# Patient Record
Sex: FEMALE | Race: BLACK OR AFRICAN AMERICAN | Employment: OTHER | URBAN - METROPOLITAN AREA
[De-identification: names, ages, dates, MRNs, and addresses within clinical notes are randomized per-mention and may not be internally consistent; named-entity substitution may affect disease eponyms.]

---

## 2023-02-22 LAB
ALANINE AMINOTRANSFERASE (SGPT) (U/L) IN SER/PLAS: 14 U/L (ref 7–45)
ALBUMIN (G/DL) IN SER/PLAS: 4.2 G/DL (ref 3.4–5)
ALKALINE PHOSPHATASE (U/L) IN SER/PLAS: 81 U/L (ref 33–136)
ANION GAP IN SER/PLAS: 14 MMOL/L (ref 10–20)
ASPARTATE AMINOTRANSFERASE (SGOT) (U/L) IN SER/PLAS: 10 U/L (ref 9–39)
BILIRUBIN TOTAL (MG/DL) IN SER/PLAS: 0.2 MG/DL (ref 0–1.2)
CALCIUM (MG/DL) IN SER/PLAS: 8.9 MG/DL (ref 8.6–10.6)
CARBON DIOXIDE, TOTAL (MMOL/L) IN SER/PLAS: 25 MMOL/L (ref 21–32)
CHLORIDE (MMOL/L) IN SER/PLAS: 106 MMOL/L (ref 98–107)
CREATININE (MG/DL) IN SER/PLAS: 0.82 MG/DL (ref 0.5–1.05)
GFR FEMALE: 71 ML/MIN/1.73M2
GLUCOSE (MG/DL) IN SER/PLAS: 143 MG/DL (ref 74–99)
POTASSIUM (MMOL/L) IN SER/PLAS: 4.4 MMOL/L (ref 3.5–5.3)
PROTEIN TOTAL: 7.4 G/DL (ref 6.4–8.2)
SODIUM (MMOL/L) IN SER/PLAS: 141 MMOL/L (ref 136–145)
UREA NITROGEN (MG/DL) IN SER/PLAS: 12 MG/DL (ref 6–23)

## 2023-04-25 DIAGNOSIS — J45.909 ASTHMA, UNSPECIFIED ASTHMA SEVERITY, UNSPECIFIED WHETHER COMPLICATED, UNSPECIFIED WHETHER PERSISTENT (HHS-HCC): Primary | ICD-10-CM

## 2023-04-25 RX ORDER — MONTELUKAST SODIUM 10 MG/1
10 TABLET ORAL DAILY
Qty: 90 TABLET | Refills: 1 | Status: SHIPPED | OUTPATIENT
Start: 2023-04-25 | End: 2023-10-23

## 2023-04-25 RX ORDER — MONTELUKAST SODIUM 10 MG/1
1 TABLET ORAL DAILY
COMMUNITY
Start: 2019-05-16 | End: 2023-04-25 | Stop reason: SDUPTHER

## 2023-05-13 DIAGNOSIS — I10 HYPERTENSION, UNSPECIFIED TYPE: Primary | ICD-10-CM

## 2023-05-15 RX ORDER — AMLODIPINE BESYLATE 10 MG/1
TABLET ORAL
Qty: 100 TABLET | Refills: 2 | Status: SHIPPED | OUTPATIENT
Start: 2023-05-15 | End: 2024-01-15

## 2023-05-15 RX ORDER — AMLODIPINE BESYLATE 10 MG/1
1 TABLET ORAL DAILY
COMMUNITY
Start: 2019-05-16 | End: 2023-09-08 | Stop reason: SDUPTHER

## 2023-05-18 DIAGNOSIS — J45.909 MILD ASTHMA, UNSPECIFIED WHETHER COMPLICATED, UNSPECIFIED WHETHER PERSISTENT (HHS-HCC): Primary | ICD-10-CM

## 2023-05-19 RX ORDER — ALBUTEROL SULFATE 90 UG/1
AEROSOL, METERED RESPIRATORY (INHALATION)
Qty: 51 G | Refills: 2 | Status: SHIPPED | OUTPATIENT
Start: 2023-05-19

## 2023-05-22 ENCOUNTER — DOCUMENTATION (OUTPATIENT)
Dept: PRIMARY CARE | Facility: CLINIC | Age: 83
End: 2023-05-22
Payer: MEDICARE

## 2023-05-23 ENCOUNTER — PATIENT OUTREACH (OUTPATIENT)
Dept: PRIMARY CARE | Facility: CLINIC | Age: 83
End: 2023-05-23
Payer: MEDICARE

## 2023-05-23 DIAGNOSIS — K92.2 ACUTE GI BLEEDING: ICD-10-CM

## 2023-05-23 RX ORDER — VANCOMYCIN HYDROCHLORIDE 250 MG/1
250 CAPSULE ORAL 4 TIMES DAILY
COMMUNITY
End: 2023-11-30 | Stop reason: ALTCHOICE

## 2023-05-23 RX ORDER — PANTOPRAZOLE SODIUM 40 MG/1
40 TABLET, DELAYED RELEASE ORAL
COMMUNITY
End: 2023-07-05 | Stop reason: SDUPTHER

## 2023-05-23 RX ORDER — METRONIDAZOLE 500 MG/1
500 TABLET ORAL 3 TIMES DAILY
COMMUNITY
End: 2023-11-30 | Stop reason: ALTCHOICE

## 2023-05-23 RX ORDER — CIPROFLOXACIN 500 MG/1
500 TABLET ORAL
COMMUNITY
End: 2023-09-08 | Stop reason: ALTCHOICE

## 2023-05-23 NOTE — PROGRESS NOTES
Discharge Facility:[facility]  Discharge Diagnosis:Acute GI bleeding   Admission Date:5/12/23  Discharge Date: 5/19/23    PCP Appointment Date:No available appts  Specialist Appointment Date:   Hospital Encounter and Summary: not available at this time  See discharge assessment below for further details

## 2023-06-23 NOTE — PROGRESS NOTES
Patient never followed up with PCP  Unable to reach patient.  This patient never followed up with PCP.  LVM  with call back number for patient to call if needed.

## 2023-07-05 DIAGNOSIS — R13.10 DYSPHAGIA, UNSPECIFIED TYPE: Primary | ICD-10-CM

## 2023-07-05 RX ORDER — PANTOPRAZOLE SODIUM 40 MG/1
40 TABLET, DELAYED RELEASE ORAL
Qty: 90 TABLET | Refills: 2 | Status: SHIPPED | OUTPATIENT
Start: 2023-07-05 | End: 2024-04-23

## 2023-07-27 ENCOUNTER — OFFICE VISIT (OUTPATIENT)
Dept: PRIMARY CARE | Facility: CLINIC | Age: 83
End: 2023-07-27
Payer: MEDICARE

## 2023-07-27 VITALS
DIASTOLIC BLOOD PRESSURE: 66 MMHG | HEIGHT: 63 IN | BODY MASS INDEX: 30.65 KG/M2 | TEMPERATURE: 97.2 F | HEART RATE: 80 BPM | SYSTOLIC BLOOD PRESSURE: 113 MMHG | WEIGHT: 173 LBS

## 2023-07-27 DIAGNOSIS — M54.2 NECK PAIN: Primary | ICD-10-CM

## 2023-07-27 DIAGNOSIS — E78.9 LIPID DISORDER: ICD-10-CM

## 2023-07-27 DIAGNOSIS — E11.9 TYPE 2 DIABETES MELLITUS WITHOUT COMPLICATION, WITHOUT LONG-TERM CURRENT USE OF INSULIN (MULTI): ICD-10-CM

## 2023-07-27 DIAGNOSIS — K27.9 PUD (PEPTIC ULCER DISEASE): ICD-10-CM

## 2023-07-27 LAB
ALANINE AMINOTRANSFERASE (SGPT) (U/L) IN SER/PLAS: 10 U/L (ref 7–45)
ALBUMIN (G/DL) IN SER/PLAS: 4.1 G/DL (ref 3.4–5)
ALKALINE PHOSPHATASE (U/L) IN SER/PLAS: 86 U/L (ref 33–136)
ASPARTATE AMINOTRANSFERASE (SGOT) (U/L) IN SER/PLAS: 10 U/L (ref 9–39)
BILIRUBIN DIRECT (MG/DL) IN SER/PLAS: 0 MG/DL (ref 0–0.3)
BILIRUBIN TOTAL (MG/DL) IN SER/PLAS: 0.2 MG/DL (ref 0–1.2)
CHOLESTEROL (MG/DL) IN SER/PLAS: 144 MG/DL (ref 0–199)
CHOLESTEROL IN HDL (MG/DL) IN SER/PLAS: 40.2 MG/DL
CHOLESTEROL/HDL RATIO: 3.6
LDL: 69 MG/DL (ref 0–99)
PROTEIN TOTAL: 7.4 G/DL (ref 6.4–8.2)
TRIGLYCERIDE (MG/DL) IN SER/PLAS: 175 MG/DL (ref 0–149)
VLDL: 35 MG/DL (ref 0–40)

## 2023-07-27 PROCEDURE — 80076 HEPATIC FUNCTION PANEL: CPT

## 2023-07-27 PROCEDURE — 99214 OFFICE O/P EST MOD 30 MIN: CPT | Performed by: FAMILY MEDICINE

## 2023-07-27 PROCEDURE — 1036F TOBACCO NON-USER: CPT | Performed by: FAMILY MEDICINE

## 2023-07-27 PROCEDURE — 1159F MED LIST DOCD IN RCRD: CPT | Performed by: FAMILY MEDICINE

## 2023-07-27 PROCEDURE — 80061 LIPID PANEL: CPT

## 2023-07-27 PROCEDURE — 1126F AMNT PAIN NOTED NONE PRSNT: CPT | Performed by: FAMILY MEDICINE

## 2023-07-27 RX ORDER — PREDNISONE 5 MG/1
1 TABLET ORAL DAILY
COMMUNITY
Start: 2019-09-03

## 2023-07-27 RX ORDER — GLIPIZIDE 2.5 MG/1
1 TABLET, EXTENDED RELEASE ORAL DAILY
COMMUNITY
Start: 2022-12-13 | End: 2023-12-06 | Stop reason: SDUPTHER

## 2023-07-27 RX ORDER — ATORVASTATIN CALCIUM 10 MG/1
1 TABLET, FILM COATED ORAL DAILY
COMMUNITY
Start: 2022-11-17 | End: 2024-02-12

## 2023-07-27 RX ORDER — EMPAGLIFLOZIN 25 MG/1
1 TABLET, FILM COATED ORAL DAILY
COMMUNITY
Start: 2020-02-03 | End: 2023-12-06 | Stop reason: SDUPTHER

## 2023-07-27 RX ORDER — AZATHIOPRINE 50 MG/1
50 TABLET ORAL DAILY
COMMUNITY
End: 2023-12-27

## 2023-07-27 RX ORDER — AZELASTINE 1 MG/ML
2 SPRAY, METERED NASAL 2 TIMES DAILY
COMMUNITY
Start: 2021-03-25

## 2023-07-27 NOTE — PROGRESS NOTES
Ms. Chilel is an 82 year old woman with PMH diabetes, CAD, colitis and GI bleeding who presents for follow up. She is here for follow up from hospitalization at [facility] for acute GI bleed and colitis, from May 12 to 19. She has no history of gastric surgeries. She reports she is doing well following discharge.    She would like a refill of the pantoprazole which was started at discharge. Her prescription was changed to twice a day before meals.    She also had questions about her vaccinations. Her MyChart indicates she is due for MMR (health care personnel and born before 1957). She has had Shringrix. Pneumovax last on 7/16/2011.    Finally, she indicated she is experiencing neck pain and neck tension.    Plan:  - Advised patient to avoid NSAIDS which she confirmed she does not use  - Advised patient to call Gastroenterologist at [facility] for refill of pantoprazole  - Hepatic function panel and lipid panel  - Referral to [facility] for management of neck pain  - Defer immunizations to annual exam

## 2023-09-07 PROBLEM — Z86.0100 PERSONAL HISTORY OF COLONIC POLYPS: Status: ACTIVE | Noted: 2023-09-07

## 2023-09-07 PROBLEM — M54.42 CHRONIC BILATERAL LOW BACK PAIN WITH LEFT-SIDED SCIATICA: Status: ACTIVE | Noted: 2023-09-07

## 2023-09-07 PROBLEM — R57.8 HEMORRHAGIC SHOCK (MULTI): Status: ACTIVE | Noted: 2023-05-14

## 2023-09-07 PROBLEM — H40.9 GLAUCOMA: Status: ACTIVE | Noted: 2023-09-07

## 2023-09-07 PROBLEM — L73.9 FOLLICULITIS: Status: ACTIVE | Noted: 2023-09-07

## 2023-09-07 PROBLEM — D64.9 ANEMIA: Status: ACTIVE | Noted: 2023-09-07

## 2023-09-07 PROBLEM — I25.10 CAD (CORONARY ARTERY DISEASE): Status: ACTIVE | Noted: 2023-09-07

## 2023-09-07 PROBLEM — L40.9 PSORIASIS: Status: ACTIVE | Noted: 2023-09-07

## 2023-09-07 PROBLEM — L10.0 PEMPHIGUS VULGARIS (MULTI): Status: ACTIVE | Noted: 2020-03-02

## 2023-09-07 PROBLEM — L12.0 BULLOUS PEMPHIGOID (MULTI): Status: ACTIVE | Noted: 2023-02-22

## 2023-09-07 PROBLEM — R31.9 HEMATURIA: Status: ACTIVE | Noted: 2023-09-07

## 2023-09-07 PROBLEM — J45.20 MILD INTERMITTENT ASTHMA WITHOUT COMPLICATION (HHS-HCC): Status: ACTIVE | Noted: 2023-09-07

## 2023-09-07 PROBLEM — D50.0 IRON DEFICIENCY ANEMIA DUE TO CHRONIC BLOOD LOSS: Status: ACTIVE | Noted: 2018-12-06

## 2023-09-07 PROBLEM — S49.91XA RIGHT SHOULDER INJURY: Status: ACTIVE | Noted: 2023-09-07

## 2023-09-07 PROBLEM — H02.403 PTOSIS OF EYELID, BILATERAL: Status: ACTIVE | Noted: 2018-07-16

## 2023-09-07 PROBLEM — N76.0 VAGINITIS: Status: ACTIVE | Noted: 2023-09-07

## 2023-09-07 PROBLEM — K57.90 DIVERTICULOSIS: Status: ACTIVE | Noted: 2023-05-13

## 2023-09-07 PROBLEM — M99.05 SEGMENTAL AND SOMATIC DYSFUNCTION OF PELVIC REGION: Status: ACTIVE | Noted: 2023-09-07

## 2023-09-07 PROBLEM — M53.3 SACROILIAC DYSFUNCTION: Status: ACTIVE | Noted: 2023-09-07

## 2023-09-07 PROBLEM — K57.30 DIVERTICULA OF COLON: Status: ACTIVE | Noted: 2023-05-14

## 2023-09-07 PROBLEM — R19.00 PELVIC MASS: Status: ACTIVE | Noted: 2023-09-07

## 2023-09-07 PROBLEM — E11.649 HYPOGLYCEMIA DUE TO TYPE 2 DIABETES MELLITUS (MULTI): Status: ACTIVE | Noted: 2023-09-07

## 2023-09-07 PROBLEM — G89.29 CHRONIC BILATERAL LOW BACK PAIN WITH LEFT-SIDED SCIATICA: Status: ACTIVE | Noted: 2023-09-07

## 2023-09-07 PROBLEM — E78.9 LIPID DISORDER: Status: ACTIVE | Noted: 2023-09-07

## 2023-09-07 PROBLEM — M75.121 COMPLETE TEAR OF RIGHT ROTATOR CUFF: Status: ACTIVE | Noted: 2018-04-27

## 2023-09-07 PROBLEM — N76.0 BACTERIAL VAGINOSIS: Status: ACTIVE | Noted: 2023-09-07

## 2023-09-07 PROBLEM — M79.18 MYALGIA, OTHER SITE: Status: ACTIVE | Noted: 2023-09-07

## 2023-09-07 PROBLEM — R93.1 AGATSTON CAC SCORE 200-399: Status: ACTIVE | Noted: 2023-09-07

## 2023-09-07 PROBLEM — R21 RASH AND OTHER NONSPECIFIC SKIN ERUPTION: Status: ACTIVE | Noted: 2023-02-22

## 2023-09-07 PROBLEM — B96.89 BACTERIAL VAGINOSIS: Status: ACTIVE | Noted: 2023-09-07

## 2023-09-07 PROBLEM — L50.9 URTICARIA: Status: ACTIVE | Noted: 2023-09-07

## 2023-09-07 PROBLEM — E09.9 STEROID-INDUCED DIABETES MELLITUS (CORRECT AND PROPERLY ADMINISTERED) (MULTI): Status: ACTIVE | Noted: 2023-05-14

## 2023-09-07 PROBLEM — H25.12 CATARACT, NUCLEAR SCLEROTIC SENILE, LEFT: Status: ACTIVE | Noted: 2023-09-07

## 2023-09-07 PROBLEM — E78.5 DYSLIPIDEMIA: Status: ACTIVE | Noted: 2023-09-07

## 2023-09-07 PROBLEM — E66.9 OBESITY, CLASS I, BMI 30-34.9: Status: ACTIVE | Noted: 2020-03-04

## 2023-09-07 PROBLEM — R29.818 SUSPECTED SLEEP APNEA: Status: ACTIVE | Noted: 2023-09-07

## 2023-09-07 PROBLEM — E11.9 TYPE 2 DIABETES MELLITUS WITHOUT COMPLICATION, WITHOUT LONG-TERM CURRENT USE OF INSULIN (MULTI): Status: ACTIVE | Noted: 2017-07-18

## 2023-09-07 PROBLEM — N94.89 ADNEXAL MASS: Status: ACTIVE | Noted: 2020-03-03

## 2023-09-07 PROBLEM — T38.0X5A STEROID-INDUCED DIABETES MELLITUS (CORRECT AND PROPERLY ADMINISTERED) (MULTI): Status: ACTIVE | Noted: 2023-05-14

## 2023-09-07 PROBLEM — E66.811 OBESITY, CLASS I, BMI 30-34.9: Status: ACTIVE | Noted: 2020-03-04

## 2023-09-07 PROBLEM — M99.04 SEGMENTAL AND SOMATIC DYSFUNCTION OF SACRAL REGION: Status: ACTIVE | Noted: 2023-09-07

## 2023-09-07 PROBLEM — C44.92 SCC (SQUAMOUS CELL CARCINOMA): Status: ACTIVE | Noted: 2023-09-07

## 2023-09-07 PROBLEM — M99.03 SEGMENTAL AND SOMATIC DYSFUNCTION OF LUMBAR REGION: Status: ACTIVE | Noted: 2023-09-07

## 2023-09-07 PROBLEM — C50.919 BREAST CANCER (MULTI): Status: ACTIVE | Noted: 2023-09-07

## 2023-09-07 PROBLEM — L12.9 PEMPHIGOID (MULTI): Status: ACTIVE | Noted: 2023-09-07

## 2023-09-07 PROBLEM — K51.919 ULCERATIVE COLITIS WITH COMPLICATION (MULTI): Status: ACTIVE | Noted: 2023-05-13

## 2023-09-07 PROBLEM — R09.89 CAROTID BRUIT: Status: ACTIVE | Noted: 2023-09-07

## 2023-09-07 PROBLEM — R06.83 SNORING: Status: ACTIVE | Noted: 2023-09-07

## 2023-09-07 PROBLEM — H40.1112 PRIMARY OPEN ANGLE GLAUCOMA OF RIGHT EYE, MODERATE STAGE: Status: ACTIVE | Noted: 2017-11-16

## 2023-09-07 PROBLEM — E11.65 UNCONTROLLED TYPE 2 DIABETES MELLITUS WITH HYPERGLYCEMIA, WITHOUT LONG-TERM CURRENT USE OF INSULIN (MULTI): Status: ACTIVE | Noted: 2023-09-07

## 2023-09-07 PROBLEM — Q83.9 BREAST ANOMALY: Status: ACTIVE | Noted: 2023-09-07

## 2023-09-07 PROBLEM — J45.909 ASTHMA (HHS-HCC): Status: ACTIVE | Noted: 2023-09-07

## 2023-09-07 PROBLEM — I10 PRIMARY HYPERTENSION: Status: ACTIVE | Noted: 2017-07-18

## 2023-09-07 PROBLEM — N89.8 VAGINAL DISCHARGE: Status: ACTIVE | Noted: 2023-09-07

## 2023-09-07 PROBLEM — E55.9 VITAMIN D DEFICIENCY: Status: ACTIVE | Noted: 2023-09-07

## 2023-09-07 PROBLEM — Z86.010 PERSONAL HISTORY OF COLONIC POLYPS: Status: ACTIVE | Noted: 2023-09-07

## 2023-09-07 PROBLEM — R01.1 HEART MURMUR: Status: ACTIVE | Noted: 2023-09-07

## 2023-09-07 RX ORDER — BRIMONIDINE TARTRATE 2 MG/ML
1 SOLUTION/ DROPS OPHTHALMIC 2 TIMES DAILY
COMMUNITY
Start: 2022-11-08 | End: 2023-11-30 | Stop reason: ALTCHOICE

## 2023-09-07 RX ORDER — SULFASALAZINE 500 MG/1
1000 TABLET ORAL 2 TIMES DAILY
COMMUNITY
Start: 2015-02-25 | End: 2023-11-30 | Stop reason: SINTOL

## 2023-09-07 RX ORDER — TAMOXIFEN CITRATE 20 MG/1
20 TABLET ORAL DAILY
COMMUNITY
Start: 2018-10-17 | End: 2023-11-30 | Stop reason: ALTCHOICE

## 2023-09-07 RX ORDER — ERGOCALCIFEROL 1.25 MG/1
50000 CAPSULE ORAL WEEKLY
COMMUNITY
Start: 2022-07-06

## 2023-09-07 RX ORDER — MESALAMINE 800 MG/1
1 TABLET, DELAYED RELEASE ORAL 4 TIMES DAILY
COMMUNITY
Start: 2013-10-17

## 2023-09-07 RX ORDER — GABAPENTIN 300 MG/1
300 CAPSULE ORAL 3 TIMES DAILY
COMMUNITY
Start: 2014-03-14 | End: 2023-11-30 | Stop reason: ALTCHOICE

## 2023-09-07 RX ORDER — BRIMONIDINE TARTRATE 2 MG/ML
1 SOLUTION/ DROPS OPHTHALMIC 2 TIMES DAILY
COMMUNITY
Start: 2016-03-15 | End: 2023-09-08 | Stop reason: SDUPTHER

## 2023-09-07 RX ORDER — HYDROCORTISONE 25 MG/G
1 CREAM TOPICAL 2 TIMES DAILY
COMMUNITY
Start: 2019-08-15 | End: 2023-12-06 | Stop reason: SDUPTHER

## 2023-09-07 RX ORDER — CLOBETASOL PROPIONATE 0.5 MG/G
1 OINTMENT TOPICAL 2 TIMES DAILY
COMMUNITY
Start: 2019-08-27

## 2023-09-07 RX ORDER — LORATADINE PSEUDOEPHEDRINE SULFATE 10; 240 MG/1; MG/1
1 TABLET, EXTENDED RELEASE ORAL DAILY PRN
COMMUNITY

## 2023-09-07 RX ORDER — MESALAMINE 400 MG/1
400 CAPSULE, DELAYED RELEASE ORAL 2 TIMES DAILY
COMMUNITY
Start: 2022-12-12 | End: 2023-11-30 | Stop reason: ALTCHOICE

## 2023-09-07 RX ORDER — TRIAMCINOLONE ACETONIDE 1 MG/G
1 OINTMENT TOPICAL 2 TIMES DAILY
COMMUNITY
Start: 2018-11-14 | End: 2023-12-06 | Stop reason: SDUPTHER

## 2023-09-07 RX ORDER — DORZOLAMIDE HYDROCHLORIDE AND TIMOLOL MALEATE 20; 5 MG/ML; MG/ML
1 SOLUTION/ DROPS OPHTHALMIC 2 TIMES DAILY
COMMUNITY
Start: 2019-02-04

## 2023-09-07 RX ORDER — LANOLIN ALCOHOL/MO/W.PET/CERES
1000 CREAM (GRAM) TOPICAL
COMMUNITY
Start: 2023-01-09

## 2023-09-07 RX ORDER — MESALAMINE 1.2 G/1
4.8 TABLET, DELAYED RELEASE ORAL
COMMUNITY
Start: 2021-10-20

## 2023-09-07 RX ORDER — HYDROCORTISONE 25 MG/G
1 OINTMENT TOPICAL
COMMUNITY
Start: 2022-05-18 | End: 2024-04-17 | Stop reason: SDUPTHER

## 2023-09-07 RX ORDER — MESALAMINE 1.2 G/1
2 TABLET, DELAYED RELEASE ORAL DAILY
COMMUNITY
Start: 2013-06-19 | End: 2023-11-30 | Stop reason: ALTCHOICE

## 2023-09-07 RX ORDER — COLESTIPOL HYDROCHLORIDE 5 G/5G
5 GRANULE, FOR SUSPENSION ORAL DAILY
COMMUNITY
Start: 2019-12-18 | End: 2023-11-30 | Stop reason: ALTCHOICE

## 2023-09-07 RX ORDER — NETARSUDIL 0.2 MG/ML
1 SOLUTION/ DROPS OPHTHALMIC; TOPICAL
COMMUNITY
Start: 2019-11-21

## 2023-09-07 RX ORDER — ASPIRIN 81 MG/1
81 TABLET ORAL DAILY
COMMUNITY
Start: 2015-09-03 | End: 2023-11-30 | Stop reason: ALTCHOICE

## 2023-09-07 RX ORDER — AMLODIPINE BESYLATE 5 MG/1
5 TABLET ORAL DAILY
COMMUNITY
Start: 2014-04-09 | End: 2023-11-30 | Stop reason: ALTCHOICE

## 2023-09-07 RX ORDER — GLIPIZIDE 5 MG/1
5 TABLET, FILM COATED, EXTENDED RELEASE ORAL
COMMUNITY
Start: 2023-06-30 | End: 2023-12-06 | Stop reason: ALTCHOICE

## 2023-09-07 RX ORDER — METFORMIN HYDROCHLORIDE 500 MG/1
500 TABLET ORAL DAILY
COMMUNITY
Start: 2019-01-25 | End: 2023-11-30 | Stop reason: WASHOUT

## 2023-09-07 RX ORDER — HYDROCORTISONE 25 MG/G
1 CREAM TOPICAL AS NEEDED
COMMUNITY
Start: 2020-05-20

## 2023-09-07 RX ORDER — CALCIUM CARBONATE/VITAMIN D3 250-3.125
1 TABLET ORAL 2 TIMES DAILY
COMMUNITY
Start: 2018-03-19

## 2023-09-07 RX ORDER — MESALAMINE 0.38 G/1
4 CAPSULE, EXTENDED RELEASE ORAL DAILY
COMMUNITY
Start: 2016-02-19 | End: 2023-11-30 | Stop reason: WASHOUT

## 2023-09-07 RX ORDER — LATANOPROST 50 UG/ML
1 SOLUTION/ DROPS OPHTHALMIC NIGHTLY
COMMUNITY
Start: 2022-10-25

## 2023-09-07 RX ORDER — TRIAMCINOLONE ACETONIDE 1 MG/G
1 CREAM TOPICAL
COMMUNITY
Start: 2019-09-03 | End: 2024-04-17 | Stop reason: SDUPTHER

## 2023-09-07 RX ORDER — ALBUTEROL SULFATE 0.83 MG/ML
2.5 SOLUTION RESPIRATORY (INHALATION) 4 TIMES DAILY PRN
COMMUNITY
Start: 2016-01-06

## 2023-09-07 RX ORDER — MOMETASONE FUROATE 110 UG/1
1 INHALANT RESPIRATORY (INHALATION)
COMMUNITY
Start: 2014-09-09 | End: 2023-11-30 | Stop reason: ALTCHOICE

## 2023-09-07 RX ORDER — LORATADINE 10 MG/1
1 TABLET ORAL DAILY
COMMUNITY
Start: 2021-03-25

## 2023-09-07 RX ORDER — LATANOPROST 50 UG/ML
1 SOLUTION/ DROPS OPHTHALMIC NIGHTLY
COMMUNITY
Start: 2019-04-22 | End: 2023-09-08 | Stop reason: SDUPTHER

## 2023-09-07 RX ORDER — PREDNISOLONE ACETATE 10 MG/ML
1 SUSPENSION/ DROPS OPHTHALMIC
COMMUNITY
Start: 2022-03-16 | End: 2023-11-30 | Stop reason: ALTCHOICE

## 2023-09-07 RX ORDER — CETIRIZINE HYDROCHLORIDE 10 MG/1
10 TABLET ORAL
COMMUNITY

## 2023-09-07 RX ORDER — ACETAMINOPHEN 500 MG
1000 TABLET ORAL 2 TIMES DAILY PRN
COMMUNITY
Start: 2016-09-16

## 2023-09-07 RX ORDER — LOSARTAN POTASSIUM 50 MG/1
1 TABLET ORAL DAILY
COMMUNITY
Start: 2016-01-25 | End: 2023-11-30 | Stop reason: ALTCHOICE

## 2023-09-07 RX ORDER — BRIMONIDINE TARTRATE 2 MG/ML
1 SOLUTION/ DROPS OPHTHALMIC 3 TIMES DAILY
COMMUNITY
Start: 2016-03-15

## 2023-09-07 RX ORDER — CHOLECALCIFEROL (VITAMIN D3) 125 MCG
3000 CAPSULE ORAL AS NEEDED
COMMUNITY

## 2023-09-07 RX ORDER — CYANOCOBALAMIN (VITAMIN B-12) 500 MCG
3 TABLET ORAL
COMMUNITY
Start: 2023-02-22

## 2023-09-07 RX ORDER — MESALAMINE 1.2 G/1
1.2 TABLET, DELAYED RELEASE ORAL 2 TIMES DAILY
COMMUNITY
Start: 2013-06-19 | End: 2023-11-30 | Stop reason: WASHOUT

## 2023-09-07 RX ORDER — FLUTICASONE FUROATE AND VILANTEROL 100; 25 UG/1; UG/1
1 POWDER RESPIRATORY (INHALATION) DAILY
COMMUNITY
Start: 2019-05-16 | End: 2023-11-02

## 2023-09-07 RX ORDER — FLUTICASONE PROPIONATE 50 MCG
2 SPRAY, SUSPENSION (ML) NASAL DAILY
COMMUNITY
Start: 2016-01-21 | End: 2023-11-30 | Stop reason: ALTCHOICE

## 2023-09-07 RX ORDER — ASCORBIC ACID 500 MG
1 TABLET,CHEWABLE ORAL DAILY
COMMUNITY
Start: 2012-05-08 | End: 2023-11-30 | Stop reason: ALTCHOICE

## 2023-09-07 RX ORDER — KETOCONAZOLE 20 MG/G
1 CREAM TOPICAL 2 TIMES DAILY
COMMUNITY

## 2023-09-07 RX ORDER — ACETAMINOPHEN 500 MG
50 TABLET ORAL DAILY
COMMUNITY
Start: 2012-05-08

## 2023-09-07 RX ORDER — FLUTICASONE PROPIONATE 50 MCG
2 SPRAY, SUSPENSION (ML) NASAL AS NEEDED
COMMUNITY
Start: 2014-03-17

## 2023-09-07 RX ORDER — HYDROXYZINE HYDROCHLORIDE 25 MG/1
25 TABLET, FILM COATED ORAL NIGHTLY PRN
COMMUNITY
End: 2023-11-30 | Stop reason: ALTCHOICE

## 2023-09-07 RX ORDER — PREDNISONE 10 MG/1
40 TABLET ORAL
COMMUNITY
Start: 2016-01-06 | End: 2023-11-30 | Stop reason: ALTCHOICE

## 2023-09-08 PROBLEM — L30.9 DERMATITIS, ECZEMATOID: Status: ACTIVE | Noted: 2023-09-08

## 2023-10-17 ENCOUNTER — APPOINTMENT (OUTPATIENT)
Dept: INTEGRATIVE MEDICINE | Facility: CLINIC | Age: 83
End: 2023-10-17
Payer: MEDICARE

## 2023-10-23 ENCOUNTER — APPOINTMENT (OUTPATIENT)
Dept: INTEGRATIVE MEDICINE | Facility: CLINIC | Age: 83
End: 2023-10-23
Payer: MEDICARE

## 2023-10-23 DIAGNOSIS — J45.909 ASTHMA, UNSPECIFIED ASTHMA SEVERITY, UNSPECIFIED WHETHER COMPLICATED, UNSPECIFIED WHETHER PERSISTENT (HHS-HCC): ICD-10-CM

## 2023-10-23 RX ORDER — MONTELUKAST SODIUM 10 MG/1
10 TABLET ORAL DAILY
Qty: 80 TABLET | Refills: 3 | Status: SHIPPED | OUTPATIENT
Start: 2023-10-23 | End: 2023-11-30 | Stop reason: ALTCHOICE

## 2023-11-02 ENCOUNTER — APPOINTMENT (OUTPATIENT)
Dept: INTEGRATIVE MEDICINE | Facility: CLINIC | Age: 83
End: 2023-11-02
Payer: MEDICARE

## 2023-11-02 DIAGNOSIS — J45.909 ASTHMA, UNSPECIFIED ASTHMA SEVERITY, UNSPECIFIED WHETHER COMPLICATED, UNSPECIFIED WHETHER PERSISTENT (HHS-HCC): Primary | ICD-10-CM

## 2023-11-02 RX ORDER — FLUTICASONE FUROATE AND VILANTEROL TRIFENATATE 100; 25 UG/1; UG/1
1 POWDER RESPIRATORY (INHALATION) DAILY
Qty: 180 EACH | Refills: 3 | Status: SHIPPED | OUTPATIENT
Start: 2023-11-02

## 2023-11-09 ENCOUNTER — ALLIED HEALTH (OUTPATIENT)
Dept: INTEGRATIVE MEDICINE | Facility: CLINIC | Age: 83
End: 2023-11-09
Payer: MEDICARE

## 2023-11-09 DIAGNOSIS — M54.59 OTHER LOW BACK PAIN: Primary | ICD-10-CM

## 2023-11-09 PROCEDURE — 97810 ACUP 1/> WO ESTIM 1ST 15 MIN: CPT | Performed by: ACUPUNCTURIST

## 2023-11-09 PROCEDURE — 97811 ACUP 1/> W/O ESTIM EA ADD 15: CPT | Performed by: ACUPUNCTURIST

## 2023-11-09 NOTE — PROGRESS NOTES
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  Low back pain daily, relieved with bending forward.  Sleep is poor. She sleeps for 3-4 hrs/night. More sleep when taking benadryl.  No radiation down legs.  L shoulder tear.  Neck pain.    Initial  other low back pain  neck pain  shoulder pain  knee pain    other LBP:  states that she has had LBP for years,  feels down the step in 1986 - injured her back then.  her back has gotten progressively worse over the years.  sometimes the pain is so bad she bends over to relieve the pain in her back when she walks  pain is between 7-8/10 and constant, has the pain with sitting, standing and lying down.   pain when lying down is a 4-5/10   lidocaine patches reduce her pain   denies using heat or ice   takes Tylenol and the arthritis Tylenol helps her better - takes 2 every morning   LBP is worse with walking and standing, using a walking cane  pain used to radiate down her legs any more but used to. it's been awhile since shes had   denies pain the buttocks   \"tired of hurting so much\"     neck pain/shoulder pain:  for the last 3 months radiates from base of occiput down in GB21 into renée marissa   pain level today is 6/10  neck pain radiates into L shoulder, pt has frozen shoulder-like sxs.             Session Information  Is this acupuncture treatment being billed to the patient's insurance company: Yes  This is visit number: 4  The patient has a total number of visits of: 12  Initial Acupuncture Treatment date: 09/14/23  Last Treatment date: 12/14/23  Name of Insurance Company: [facility] Medicare Adv : VPCY  Visit Type: Follow-up visit         Review of Systems         Provider reviewed plan for the acupuncture session, precautions and contraindications. Patient/guardian/hospital staff has " given consent to treat with full understanding of what to expect during the session. Before acupuncture began, provider explained to the patient to communicate at any time if the procedure was causing discomfort past their tolerance level. Patient agreed to advise acupuncturist. The acupuncturist counseled the patient on the risks of acupuncture treatment including pain, infection, bleeding, and no relief of pain. The patient was positioned comfortably. There was no evidence of infection at the site of needle insertions.    Objective   Physical Exam              Acupuncture Treatment  Patient Position: Lateral recumbent on a table (LEFT side lying)  Body Points - Left: Kd 3, 7  Body Points - Bilateral: Dui 4, SP 6, UB 11, 20, 23-26  Body Points - Right: UB 62  Other Techniques Utilized: TDP Lamp  TDP Lamp Descripton: low back with 3 moxa  Needle Count In: 18  Needle Count Out: 18  Total Face to Face Time (min): 35 minutes              Assessment/Plan

## 2023-11-16 ENCOUNTER — ALLIED HEALTH (OUTPATIENT)
Dept: INTEGRATIVE MEDICINE | Facility: CLINIC | Age: 83
End: 2023-11-16
Payer: MEDICARE

## 2023-11-16 DIAGNOSIS — M54.59 OTHER LOW BACK PAIN: Primary | ICD-10-CM

## 2023-11-16 ASSESSMENT — ENCOUNTER SYMPTOMS: BACK PAIN: 1

## 2023-11-16 NOTE — PROGRESS NOTES
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 Shared she was able to walk around more at an event without noticing her low back.  States her LEFT knee has a lot of pain. Knee has been more painful than her low back.  No neck pain.    previous  States she has not been in because she was sick with cold, had cough x 1 month.  Low back pain daily, relieved with bending forward.  Sleep is poor. She sleeps for 3-4 hrs/night. More sleep when taking benadryl.  No radiation down legs.  L shoulder tear.  Neck pain.    Initial  other low back pain  neck pain  shoulder pain  knee pain    other LBP:  states that she has had LBP for years,  feels down the step in 1986 - injured her back then.  her back has gotten progressively worse over the years.  sometimes the pain is so bad she bends over to relieve the pain in her back when she walks  pain is between 7-8/10 and constant, has the pain with sitting, standing and lying down.   pain when lying down is a 4-5/10   lidocaine patches reduce her pain   denies using heat or ice   takes Tylenol and the arthritis Tylenol helps her better - takes 2 every morning   LBP is worse with walking and standing, using a walking cane  pain used to radiate down her legs any more but used to. it's been awhile since shes had   denies pain the buttocks   \"tired of hurting so much\"     neck pain/shoulder pain:  for the last 3 months radiates from base of occiput down in GB21 into renée marissa   pain level today is 6/10  neck pain radiates into L shoulder, pt has frozen shoulder-like sxs.         Back Pain        Session Information  This is visit number: 5  The patient has a total number of visits of: 12  Initial Acupuncture Treatment date: 09/14/23  Name of Insurance Company: [facility] Medicare Adv : VPCY         Review of Systems   Musculoskeletal:  Positive for back " pain.            Provider reviewed plan for the acupuncture session, precautions and contraindications. Patient/guardian/hospital staff has given consent to treat with full understanding of what to expect during the session. Before acupuncture began, provider explained to the patient to communicate at any time if the procedure was causing discomfort past their tolerance level. Patient agreed to advise acupuncturist. The acupuncturist counseled the patient on the risks of acupuncture treatment including pain, infection, bleeding, and no relief of pain. The patient was positioned comfortably. There was no evidence of infection at the site of needle insertions.    Objective   Physical Exam              Acupuncture Treatment  Patient Position: Lateral recumbent on a table  Body Points - Left: Kd 3, 7  Body Points - Bilateral: Du  4, SP 6, UB 20, 22-26  Body Points - Right: UB 62, 44.06  Other Techniques Utilized: TDP Lamp  TDP Lamp Descripton: low back with 3 moxa  Needle Count In: 19  Needle Count Out: 19  Total Face to Face Time (min): 35 minutes              Assessment/Plan

## 2023-11-30 ENCOUNTER — OFFICE VISIT (OUTPATIENT)
Dept: CARDIOLOGY | Facility: CLINIC | Age: 83
End: 2023-11-30
Payer: MEDICARE

## 2023-11-30 ENCOUNTER — PHARMACY VISIT (OUTPATIENT)
Dept: PHARMACY | Facility: CLINIC | Age: 83
End: 2023-11-30
Payer: COMMERCIAL

## 2023-11-30 VITALS
WEIGHT: 177.25 LBS | BODY MASS INDEX: 31.41 KG/M2 | OXYGEN SATURATION: 94 % | DIASTOLIC BLOOD PRESSURE: 70 MMHG | HEART RATE: 84 BPM | HEIGHT: 63 IN | SYSTOLIC BLOOD PRESSURE: 125 MMHG

## 2023-11-30 DIAGNOSIS — R60.0 LOCALIZED EDEMA: ICD-10-CM

## 2023-11-30 DIAGNOSIS — R93.1 AGATSTON CAC SCORE 200-399: ICD-10-CM

## 2023-11-30 DIAGNOSIS — E78.5 DYSLIPIDEMIA: ICD-10-CM

## 2023-11-30 DIAGNOSIS — E78.2 MIXED HYPERLIPIDEMIA: ICD-10-CM

## 2023-11-30 DIAGNOSIS — I25.10 CORONARY ARTERY DISEASE, UNSPECIFIED VESSEL OR LESION TYPE, UNSPECIFIED WHETHER ANGINA PRESENT, UNSPECIFIED WHETHER NATIVE OR TRANSPLANTED HEART: Primary | ICD-10-CM

## 2023-11-30 DIAGNOSIS — E11.65 TYPE 2 DIABETES MELLITUS WITH HYPERGLYCEMIA, WITH LONG-TERM CURRENT USE OF INSULIN (MULTI): Primary | ICD-10-CM

## 2023-11-30 DIAGNOSIS — Z79.4 TYPE 2 DIABETES MELLITUS WITH HYPERGLYCEMIA, WITH LONG-TERM CURRENT USE OF INSULIN (MULTI): Primary | ICD-10-CM

## 2023-11-30 LAB
ATRIAL RATE: 76 BPM
P AXIS: 56 DEGREES
P OFFSET: 190 MS
P ONSET: 132 MS
PR INTERVAL: 174 MS
Q ONSET: 219 MS
QRS COUNT: 13 BEATS
QRS DURATION: 84 MS
QT INTERVAL: 410 MS
QTC CALCULATION(BAZETT): 461 MS
QTC FREDERICIA: 443 MS
R AXIS: 32 DEGREES
T AXIS: 13 DEGREES
T OFFSET: 424 MS
VENTRICULAR RATE: 76 BPM

## 2023-11-30 PROCEDURE — 93005 ELECTROCARDIOGRAM TRACING: CPT | Performed by: INTERNAL MEDICINE

## 2023-11-30 PROCEDURE — RXMED WILLOW AMBULATORY MEDICATION CHARGE

## 2023-11-30 PROCEDURE — 99214 OFFICE O/P EST MOD 30 MIN: CPT | Performed by: INTERNAL MEDICINE

## 2023-11-30 PROCEDURE — 3078F DIAST BP <80 MM HG: CPT | Performed by: INTERNAL MEDICINE

## 2023-11-30 PROCEDURE — 1126F AMNT PAIN NOTED NONE PRSNT: CPT | Performed by: INTERNAL MEDICINE

## 2023-11-30 PROCEDURE — 93010 ELECTROCARDIOGRAM REPORT: CPT | Performed by: INTERNAL MEDICINE

## 2023-11-30 PROCEDURE — 1036F TOBACCO NON-USER: CPT | Performed by: INTERNAL MEDICINE

## 2023-11-30 PROCEDURE — 3074F SYST BP LT 130 MM HG: CPT | Performed by: INTERNAL MEDICINE

## 2023-11-30 PROCEDURE — 1159F MED LIST DOCD IN RCRD: CPT | Performed by: INTERNAL MEDICINE

## 2023-11-30 RX ORDER — FUROSEMIDE 20 MG/1
20 TABLET ORAL DAILY
Qty: 30 TABLET | Refills: 11 | Status: SHIPPED | OUTPATIENT
Start: 2023-11-30 | End: 2024-11-29

## 2023-11-30 RX ORDER — BLOOD-GLUCOSE SENSOR
EACH MISCELLANEOUS
Qty: 2 EACH | Refills: 11 | Status: SHIPPED | OUTPATIENT
Start: 2023-11-30 | End: 2024-11-28

## 2023-11-30 ASSESSMENT — PAIN SCALES - GENERAL: PAINLEVEL: 0-NO PAIN

## 2023-11-30 NOTE — PROGRESS NOTES
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 female who presents to the [facility] for follow up of elevated CT calcium score and coronary arteriosclerosis. Last seen in May 2023.     Since our last visit, Rev. Chilel has no active cardiac symptoms of chest pain, PND, orthopnea, BERNARDA, palpitations, syncope, or claudication. Dyspnea on exertion with walking 1-2 blocks that correlates with more knee arthritis. Is using a cane for support now.     She had recent hospitalization in early  for acute lower GI bleeding from her colon with blood transfusion (Hgb barbara 6.4).      Has reduced processed carbohydrates, increased fruits and vegetables, stopped eating fried foods. Is tolerating atorvastatin 10 mg a day without side effects and LDL < 70 on 2023 lipid panel. She is taking Jardiance 25 mg a day for cardioprotection and glycemic control.     She had a CT calcium score done 10/3/2019 with total of 337. MEJIA 10 year risk score for MI was 29% based on 2020 risk factors (diabetes type 2, family history, hypertension, dyslipidemia) prior to modification by treatment plan.     Past Medical History:  1. Coronary arteriosclerosis: 10/3/2019 CT calcium score 337; MEJIA 10 year risk score for MI 29% (high risk).  2. Dyslipidemia: 8/15/2019: ; ; HDL 29; LDL 87  3. Diabetes Mellitus, type 2  4. Hypertension  5. Crohns' disease  6. h/o Breast cancer  7. Bullous pemphigus  8. Asthma     Social History:  Never a smoker     Family History:  Mother had heart disease and ESRD. 2 brothers had MI and ESRD. Sister recently  of a heart attack.     Review of Systems    A 14 point review of systems was asked. All questions were negative except for pertinent positives listed in the HPI.      Objective   Physical Exam  BP Readings from Last 3 Encounters:   23 125/70   23 113/66   23 122/71      Wt Readings from Last 3 Encounters:   23 80.4 kg (177 lb 4 " "oz)   23 78.5 kg (173 lb)   23 78.5 kg (173 lb)      BMI: Estimated body mass index is 31.9 kg/m² as calculated from the following:    Height as of this encounter: 1.588 m (5' 2.5\").    Weight as of this encounter: 80.4 kg (177 lb 4 oz).  BSA: Estimated body surface area is 1.88 meters squared as calculated from the following:    Height as of this encounter: 1.588 m (5' 2.5\").    Weight as of this encounter: 80.4 kg (177 lb 4 oz).    General: no acute distress  HEENT: EOMI, no scleral icterus.  Lungs: Clear to auscultation bilaterally without wheezing, rales, or rhonchi.  Cardiovascular: Regular rhythm and rate. Normal S1 and S2. No murmurs, rubs, or gallops are appreciated. JVP normal.  Abdomen: Soft, nontender, nondistended. Bowel sounds present.  Extremities: Warm and well perfused with equal 2+ pulses bilaterally.  No edema present.  Neurologic: Alert and oriented x3.    I have personally reviewed the following images and laboratory findings:  Last echocardiogram: n/a    Last cath / stress test: oronary arteriosclerosis: 10/3/2019 CT calcium score 337    Most recent EC2023 ECG: Sinus rhythm, 76 bpm, normal ECG. Personally reviewed in office.    Lab Results   Component Value Date    CHOL 144 2023    CHOL 160 2022    CHOL 130 2021     Lab Results   Component Value Date    HDL 40.2 2023    HDL 40.4 2022    HDL 42.9 2021     No results found for: \"LDLCALC\"  Lab Results   Component Value Date    TRIG 175 (H) 2023    TRIG 239 (H) 2022    TRIG 98 2021     No components found for: \"CHOLHDL\"   2023 LDL 69     Assessment/Plan   1. CAD:  10/2019 CT calcium score is 337 with MEJIA 10 year risk score for MI of 29% (high risk)  Risk factors include: type 2 diabetes, hypertension, dyslipidemia (low HDL, high TGs), family history, and inflammatory autoimmune disease (Crohns disease).  - hold aspirin 81 mg a day completely for recent  colonic " bleeding with hospitalization and prior 2021 GI bleeding history  - continue atorvastatin 10 mg a day. LDL at goal < 70 on July 2023 lipid panel. Continue statin for reduction of coronary artery plaque volume. Renewed for 12 months today.  - continue blood pressure control with amlodipine 10 mg  - continue Jardiance 10 mg a day. Continuing this SGLT2 inhibitor will reduce CV risk by 20% given diabetes type 2 status and CT calcium score evidence of atherosclerosis.      2. Cardiac murmur:  Flow murmur at the aortic valve position radiating across precordium and to carotids. This sounds more like an aortic sclerosis murmur with hyperdynamic LV function rather than aortic stenosis. Will monitor and order echocardiogram if murmur quality changes over time. There is left carotid sound referral but both ICA < 50% on 3/22/2022 duplex study.    3. Pedal edema:  Recent 7 lb weight retention with 1+ BERNARDA. Will trial furosemide 20 mg a day as needed for 2-3 lb weight gain.     Follow up with Dr. Hilton in 6 months            SIGNATURE: Rhys Hilton MD PATIENT NAME: Rand Chilel   DATE/TIME: November 30, 2023 11:48 AM MRN: [id number]

## 2023-11-30 NOTE — PATIENT INSTRUCTIONS
You were seen in the [facility] [place] for your for coronary atherosclerosis (hardening of the heart arteries).      Your ECG today is normal. Your heart exam was normal except for a flow murmur that is unchanged from our last visit together. We will monitor murmur at follow up visits and order an echocardiogram (ultrasound of the heart) if it changes.     Your October 2019 CT calcium score was high at 337. Your 10 year risk of having a heart attack with this calcium score is 29% based on your current risk factors (diabetes type 2, family history, blood pressure, cholesterol numbers).     I recommend that we do the following to reduce your heart attack risk:  1. I recommended that we stop using aspirin for you as you have had episodes of GI bleeding that required hospitalization to manage with blood transfusions at an earlier office visit together.  2. Continue atorvastatin 10 mg a day. This medication is reducing your cholesterol numbers by blocking your liver from making too much cholesterol and can help remove cholesterol plaque from the heart artery walls. Your cholesterol levels were at goal on July 2023 blood work with a low LDL (bad) cholesterol level of 69 at your long term goal of LDL < 70.   3. Diabetes risk for heart disease can be reduced using a class of medication called SGLT2 inhibitors (they make the kidneys lose extra blood sugar and this protects the heart). Continue to take Jardiance 25 mg a day.   4. Continue amlodipine 10 mg a day for better blood pressure control. Your blood pressure is at goal range 120-130 mm Hg.  5. Moderate intensity exercise at least 3 times a week for 30-45 minutes a time. Exercise helps protect the heart and blood vessels.  6. Eat a heart healthy diet. Limit portions of red meat. Eat fresh fruits and vegetables instead of processed carbohydrates. Olive oil (1 tablespoon a day) as a source of omega-3 fat. The Mediterranean diet has been shown in  clinical trials to reduce risk of heart disease by following these principles.     For your ankle swelling (edema) I ordered Lasix (furosemide) 20 mg tablets as needed. You can take 1 in the morning for 2-3 pound weight gain or ankle swelling. This water pill starts to work within 30 minutes of taking the tablet.    Follow up with Dr. Hilton in 6 months.

## 2023-12-04 ENCOUNTER — PHARMACY VISIT (OUTPATIENT)
Dept: PHARMACY | Facility: CLINIC | Age: 83
End: 2023-12-04
Payer: COMMERCIAL

## 2023-12-04 PROCEDURE — RXMED WILLOW AMBULATORY MEDICATION CHARGE

## 2023-12-06 ENCOUNTER — OFFICE VISIT (OUTPATIENT)
Dept: ENDOCRINOLOGY | Facility: CLINIC | Age: 83
End: 2023-12-06
Payer: MEDICARE

## 2023-12-06 VITALS
BODY MASS INDEX: 32.09 KG/M2 | WEIGHT: 174.4 LBS | HEIGHT: 62 IN | HEART RATE: 77 BPM | DIASTOLIC BLOOD PRESSURE: 66 MMHG | SYSTOLIC BLOOD PRESSURE: 132 MMHG

## 2023-12-06 DIAGNOSIS — I25.10 CORONARY ARTERY DISEASE INVOLVING NATIVE HEART, UNSPECIFIED VESSEL OR LESION TYPE, UNSPECIFIED WHETHER ANGINA PRESENT: ICD-10-CM

## 2023-12-06 DIAGNOSIS — E11.65 TYPE 2 DIABETES MELLITUS WITH HYPERGLYCEMIA, WITHOUT LONG-TERM CURRENT USE OF INSULIN (MULTI): Primary | ICD-10-CM

## 2023-12-06 LAB — POC HEMOGLOBIN A1C: 7.5 % (ref 4.2–6.5)

## 2023-12-06 PROCEDURE — 1159F MED LIST DOCD IN RCRD: CPT | Performed by: NURSE PRACTITIONER

## 2023-12-06 PROCEDURE — 3075F SYST BP GE 130 - 139MM HG: CPT | Performed by: NURSE PRACTITIONER

## 2023-12-06 PROCEDURE — 99214 OFFICE O/P EST MOD 30 MIN: CPT | Performed by: NURSE PRACTITIONER

## 2023-12-06 PROCEDURE — 1160F RVW MEDS BY RX/DR IN RCRD: CPT | Performed by: NURSE PRACTITIONER

## 2023-12-06 PROCEDURE — 3078F DIAST BP <80 MM HG: CPT | Performed by: NURSE PRACTITIONER

## 2023-12-06 PROCEDURE — 1036F TOBACCO NON-USER: CPT | Performed by: NURSE PRACTITIONER

## 2023-12-06 PROCEDURE — 1126F AMNT PAIN NOTED NONE PRSNT: CPT | Performed by: NURSE PRACTITIONER

## 2023-12-06 PROCEDURE — 83036 HEMOGLOBIN GLYCOSYLATED A1C: CPT | Performed by: NURSE PRACTITIONER

## 2023-12-06 PROCEDURE — 95251 CONT GLUC MNTR ANALYSIS I&R: CPT | Performed by: NURSE PRACTITIONER

## 2023-12-06 RX ORDER — GLIPIZIDE 2.5 MG/1
2.5 TABLET, EXTENDED RELEASE ORAL DAILY
Qty: 90 TABLET | Refills: 3 | Status: SHIPPED | OUTPATIENT
Start: 2023-12-06

## 2023-12-06 RX ORDER — EMPAGLIFLOZIN 25 MG/1
25 TABLET, FILM COATED ORAL DAILY
Qty: 90 TABLET | Refills: 3 | Status: SHIPPED | OUTPATIENT
Start: 2023-12-06

## 2023-12-06 NOTE — PROGRESS NOTES
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 . Initial diagnosis with diabetes was 4 years ago. The patient has a family history mother, 2 brothers, and 1 sister with diabetes.   Known complications include: None    Previously seeing PCP for diabetes care.   Last visit with me 7/2023  A1c 7.5% today. Previous A1c 6.7% in 3/2023   Since last visit, she does not report any changes  She has not worn CGM since mid November  Says they are falling off   Does not do fingersticks when this os off    Historical meds:   Metformin- intolerable, caused GI upset, rectal bleeding which lead to anemia     Current diabetes regimen is as follows:   Jardiance 25 mg daily   glipizide ER 2.5 mg once daily     Patient is using continuous glucose monitor -Car 3 (not wearing today)  The patient is currently checking the blood glucose as needed         Hypoglycemia frequency: 1%  Hypoglycemia awareness: yes     The patient comes into the office today unhappy with A1c.  She feels this is too high.        ROS  General: no fever, chills or acute changes in weight in the last 6 months  Skin: no rashes, pruritis or dry skin  Cardiac: denies chest pain, heart palpitations or orthopnea  Pulmonary: denies wheezing, productive cough or exertional dyspnea      Objective    Physical Exam  Blood pressure 132/66, pulse 77, height 1.575 m (5' 2\"), weight 79.1 kg (174 lb 6.4 oz).  General: not in acute distress, cooperative   Respiratory: normal respiratory effort  Musculoskeletal: normal gait - walks with cane      Current Outpatient Medications:     acetaminophen (Tylenol) 500 mg tablet, Take 2 tablets (1,000 mg) by mouth 2 times a day as needed. Take 2 tablets by mouth twice daily as needed for Pain. (typically takes once daily), Disp: , Rfl:     albuterol 2.5 mg /3 mL (0.083 %) nebulizer solution, Take 3 mL (2.5 mg) by nebulization 4 times a day as needed for wheezing or " shortness of breath. Inhale by nebulizer over 5-15 minutes, Disp: , Rfl:     albuterol 90 mcg/actuation inhaler, USE 2 INHALATIONS BY MOUTH EVERY 4 TO 6 HOURS AS NEEDED, Disp: 51 g, Rfl: 2    amLODIPine (Norvasc) 10 mg tablet, TAKE 1 TABLET BY MOUTH  DAILY, Disp: 100 tablet, Rfl: 2    atorvastatin (Lipitor) 10 mg tablet, Take 1 tablet (10 mg) by mouth once daily., Disp: , Rfl:     azaTHIOprine (Imuran) 50 mg tablet, Take 1 tablet (50 mg) by mouth once daily., Disp: , Rfl:     azelastine (Astelin) 137 mcg (0.1 %) nasal spray, Administer 2 sprays into each nostril 2 times a day., Disp: , Rfl:     blood-glucose sensor (FreeStyle Car 3 Sensor) device, CHANGE SENSOR EVERY 14 DAYS AS DIRECTED, Disp: 2 each, Rfl: 11    Breo Ellipta 100-25 mcg/dose inhaler, USE 1 INHALATION BY MOUTH ONCE  DAILY, Disp: 180 each, Rfl: 3    brimonidine (AlphaGAN P) 0.2 % ophthalmic solution, Administer 1 drop into affected eye(s) 3 times a day., Disp: , Rfl:     clobetasol (Temovate) 0.05 % ointment, Apply 1 Application topically 2 times a day. APPLY AND GENTLY MASSAGE INTO AFFECTED AREA(S) TWICE DAILY., Disp: , Rfl:     cyanocobalamin (Vitamin B-12) 1,000 mcg tablet, Take 1 tablet (1,000 mcg) by mouth once daily., Disp: , Rfl:     dextromethorphan-guaifenesin (Mucinex DM)  mg 12 hr tablet, Take 1 tablet by mouth 2 times a day., Disp: , Rfl:     dorzolamide-timoloL (Cosopt) 22.3-6.8 mg/mL ophthalmic solution, Administer 1 drop into affected eye(s) 2 times a day., Disp: , Rfl:     furosemide (Lasix) 20 mg tablet, Take 1 tablet (20 mg) by mouth once daily. Take as needed for ankle swelling or 2-3 pound weight gain overnight., Disp: 30 tablet, Rfl: 11    hydrocortisone 2.5 % cream, Apply 1 Application topically if needed for irritation. TO ARM/LEG, Disp: , Rfl:     hydrocortisone 2.5 % ointment, 1 Application., Disp: , Rfl:     inhalational spacing device inhaler, Use as directed, Disp: , Rfl:     ketoconazole (NIZOral) 2 % cream, Apply 1  Application topically 2 times a day. Apply one application twice daily to face until dark spots improve, Disp: , Rfl:     lactase (Lactaid) 3,000 unit tablet, Take 1 tablet (3,000 Units) by mouth if needed. Take one tablet by mouth (with first bite) as needed for dairy meals., Disp: , Rfl:     latanoprost (Xalatan) 0.005 % ophthalmic solution, Administer 1 drop into the left eye once daily at bedtime., Disp: , Rfl:     loratadine (Claritin) 10 mg tablet, Take 1 tablet (10 mg) by mouth once daily., Disp: , Rfl:     loratadine-pseudoephedrine (Claritin-D 24 Hour)  mg 24 hr tablet, Take 1 tablet by mouth once daily as needed for allergies., Disp: , Rfl:     mesalamine (Asacol HD) 800 mg EC tablet, Take 1 tablet (800 mg) by mouth 4 times a day., Disp: , Rfl:     mesalamine (Lialda) 1.2 gram EC tablet, Take 4 tablets (4.8 g) by mouth once daily. Take with Food. Do not cut tablet. If symptoms worsen, may take 2 tablets twice daily, Disp: , Rfl:     netarsudiL (Rhopressa) 0.02 % drops opthalmic solution, 1 drop., Disp: , Rfl:     pantoprazole (ProtoNix) 40 mg EC tablet, Take 1 tablet (40 mg) by mouth once daily in the morning. Take before meals. Do not crush, chew, or split., Disp: 90 tablet, Rfl: 2    predniSONE (Deltasone) 5 mg tablet, Take 1 tablet (5 mg) by mouth once daily., Disp: , Rfl:     triamcinolone (Kenalog) 0.1 % cream, Apply 1 Application topically., Disp: , Rfl:     calcium carbonate-vitamin D3 (Oyster Shell) 250 mg-3.125 mcg (125 unit) tablet, Take 1 tablet by mouth 2 times a day., Disp: , Rfl:     cetirizine (ZyrTEC) 10 mg tablet, Take 1 tablet (10 mg) by mouth once daily., Disp: , Rfl:     cholecalciferol (Vitamin D-3) 10 MCG (400 UNIT) tablet, 3 tablets (30 mcg)., Disp: , Rfl:     cholecalciferol (Vitamin D-3) 50 mcg (2,000 unit) capsule, Take 1 capsule (50 mcg) by mouth early in the morning.., Disp: , Rfl:     ergocalciferol (Vitamin D-2) 1.25 MG (77051 UT) capsule, Take 1 capsule (50,000 Units)  by mouth once a week., Disp: , Rfl:     fluticasone (Flonase) 50 mcg/actuation nasal spray, Administer 2 sprays into each nostril if needed for allergies., Disp: , Rfl:     glipiZIDE XL (Glucotrol XL) 2.5 mg 24 hr tablet, Take 1 tablet (2.5 mg) by mouth once daily., Disp: 90 tablet, Rfl: 3    Jardiance 25 mg, Take 1 tablet (25 mg) by mouth once daily., Disp: 90 tablet, Rfl: 3    Assessment/Plan   Type 2 diabetes with hyperglycemia:   CAD:  - A1c at high end of normal for her age and comorbidities today.  She has not been monitoring but when she was her TIR is was 80%.  Recommended putting Car back on.  Discussed dove soap and using skin tac or overlay patch for better adhesion.  She will try these.  She is without low blood sugars at this time.      Plan:   Continue Jardiance 25 mg once daily   Continue glipizide ER 2.5 mg once daily   Continue Car 3    Try the adhesive to see if they will stay on.    Try to avoid Dove soap to the arm you are placing the sensor on    Follow up 6 months

## 2023-12-06 NOTE — PATIENT INSTRUCTIONS
Continue Jardiance 25 mg once daily     Continue glipizide ER 2.5 mg once daily     Continue Car 3    Try the adhesive to see if they will stay on.    Try to avoid Dove soap to the arm you are placing the sensor on      Follow up 6 months

## 2023-12-18 PROCEDURE — RXMED WILLOW AMBULATORY MEDICATION CHARGE

## 2023-12-19 ENCOUNTER — PHARMACY VISIT (OUTPATIENT)
Dept: PHARMACY | Facility: CLINIC | Age: 83
End: 2023-12-19
Payer: COMMERCIAL

## 2023-12-26 DIAGNOSIS — L12.0 BP (BULLOUS PEMPHIGOID) (MULTI): Primary | ICD-10-CM

## 2023-12-27 RX ORDER — AZATHIOPRINE 50 MG/1
TABLET ORAL
Qty: 100 TABLET | Refills: 2 | Status: SHIPPED | OUTPATIENT
Start: 2023-12-27

## 2024-01-04 ENCOUNTER — ANCILLARY PROCEDURE (OUTPATIENT)
Dept: RADIOLOGY | Facility: CLINIC | Age: 84
End: 2024-01-04
Payer: MEDICARE

## 2024-01-04 DIAGNOSIS — Z12.31 SCREENING MAMMOGRAM FOR BREAST CANCER: ICD-10-CM

## 2024-01-04 PROCEDURE — 77067 SCR MAMMO BI INCL CAD: CPT | Performed by: RADIOLOGY

## 2024-01-04 PROCEDURE — 77063 BREAST TOMOSYNTHESIS BI: CPT

## 2024-01-04 PROCEDURE — 77063 BREAST TOMOSYNTHESIS BI: CPT | Performed by: RADIOLOGY

## 2024-01-09 ENCOUNTER — OFFICE VISIT (OUTPATIENT)
Dept: PRIMARY CARE | Facility: CLINIC | Age: 84
End: 2024-01-09
Payer: MEDICARE

## 2024-01-09 VITALS
DIASTOLIC BLOOD PRESSURE: 64 MMHG | BODY MASS INDEX: 32.39 KG/M2 | TEMPERATURE: 96.9 F | HEIGHT: 62 IN | WEIGHT: 176 LBS | SYSTOLIC BLOOD PRESSURE: 130 MMHG | HEART RATE: 85 BPM

## 2024-01-09 DIAGNOSIS — E55.9 VITAMIN D DEFICIENCY: ICD-10-CM

## 2024-01-09 DIAGNOSIS — Z00.00 ROUTINE GENERAL MEDICAL EXAMINATION AT A HEALTH CARE FACILITY: ICD-10-CM

## 2024-01-09 DIAGNOSIS — M25.562 CHRONIC PAIN OF LEFT KNEE: ICD-10-CM

## 2024-01-09 DIAGNOSIS — M81.0 OSTEOPOROSIS, UNSPECIFIED OSTEOPOROSIS TYPE, UNSPECIFIED PATHOLOGICAL FRACTURE PRESENCE: ICD-10-CM

## 2024-01-09 DIAGNOSIS — E78.9 LIPID DISORDER: ICD-10-CM

## 2024-01-09 DIAGNOSIS — G89.29 CHRONIC PAIN OF LEFT KNEE: ICD-10-CM

## 2024-01-09 DIAGNOSIS — R53.83 FATIGUE, UNSPECIFIED TYPE: ICD-10-CM

## 2024-01-09 DIAGNOSIS — E11.9 TYPE 2 DIABETES MELLITUS WITHOUT COMPLICATION, WITHOUT LONG-TERM CURRENT USE OF INSULIN (MULTI): Primary | ICD-10-CM

## 2024-01-09 LAB
25(OH)D3 SERPL-MCNC: 33 NG/ML (ref 30–100)
ALBUMIN SERPL BCP-MCNC: 4.3 G/DL (ref 3.4–5)
ALP SERPL-CCNC: 89 U/L (ref 33–136)
ALT SERPL W P-5'-P-CCNC: 8 U/L (ref 7–45)
ANION GAP SERPL CALC-SCNC: 16 MMOL/L (ref 10–20)
AST SERPL W P-5'-P-CCNC: 10 U/L (ref 9–39)
BASOPHILS # BLD AUTO: 0.04 X10*3/UL (ref 0–0.1)
BASOPHILS NFR BLD AUTO: 0.4 %
BILIRUB SERPL-MCNC: 0.2 MG/DL (ref 0–1.2)
BUN SERPL-MCNC: 13 MG/DL (ref 6–23)
CALCIUM SERPL-MCNC: 9.3 MG/DL (ref 8.6–10.6)
CHLORIDE SERPL-SCNC: 106 MMOL/L (ref 98–107)
CHOLEST SERPL-MCNC: 163 MG/DL (ref 0–199)
CHOLESTEROL/HDL RATIO: 4.2
CO2 SERPL-SCNC: 20 MMOL/L (ref 21–32)
CREAT SERPL-MCNC: 0.95 MG/DL (ref 0.5–1.05)
EGFRCR SERPLBLD CKD-EPI 2021: 60 ML/MIN/1.73M*2
EOSINOPHIL # BLD AUTO: 0.07 X10*3/UL (ref 0–0.4)
EOSINOPHIL NFR BLD AUTO: 0.8 %
ERYTHROCYTE [DISTWIDTH] IN BLOOD BY AUTOMATED COUNT: 18.6 % (ref 11.5–14.5)
GLUCOSE SERPL-MCNC: 181 MG/DL (ref 74–99)
HCT VFR BLD AUTO: 40.1 % (ref 36–46)
HDLC SERPL-MCNC: 38.7 MG/DL
HGB BLD-MCNC: 11.6 G/DL (ref 12–16)
IMM GRANULOCYTES # BLD AUTO: 0.07 X10*3/UL (ref 0–0.5)
IMM GRANULOCYTES NFR BLD AUTO: 0.8 % (ref 0–0.9)
LDLC SERPL CALC-MCNC: 77 MG/DL
LYMPHOCYTES # BLD AUTO: 0.81 X10*3/UL (ref 0.8–3)
LYMPHOCYTES NFR BLD AUTO: 9 %
MCH RBC QN AUTO: 22.3 PG (ref 26–34)
MCHC RBC AUTO-ENTMCNC: 28.9 G/DL (ref 32–36)
MCV RBC AUTO: 77 FL (ref 80–100)
MONOCYTES # BLD AUTO: 0.35 X10*3/UL (ref 0.05–0.8)
MONOCYTES NFR BLD AUTO: 3.9 %
NEUTROPHILS # BLD AUTO: 7.63 X10*3/UL (ref 1.6–5.5)
NEUTROPHILS NFR BLD AUTO: 85.1 %
NON HDL CHOLESTEROL: 124 MG/DL (ref 0–149)
NRBC BLD-RTO: 0 /100 WBCS (ref 0–0)
PLATELET # BLD AUTO: 312 X10*3/UL (ref 150–450)
POTASSIUM SERPL-SCNC: 4.1 MMOL/L (ref 3.5–5.3)
PROT SERPL-MCNC: 7.9 G/DL (ref 6.4–8.2)
RBC # BLD AUTO: 5.2 X10*6/UL (ref 4–5.2)
SODIUM SERPL-SCNC: 138 MMOL/L (ref 136–145)
TRIGL SERPL-MCNC: 236 MG/DL (ref 0–149)
TSH SERPL-ACNC: 0.89 MIU/L (ref 0.44–3.98)
VLDL: 47 MG/DL (ref 0–40)
WBC # BLD AUTO: 9 X10*3/UL (ref 4.4–11.3)

## 2024-01-09 PROCEDURE — 80061 LIPID PANEL: CPT

## 2024-01-09 PROCEDURE — 84443 ASSAY THYROID STIM HORMONE: CPT

## 2024-01-09 PROCEDURE — G0444 DEPRESSION SCREEN ANNUAL: HCPCS | Performed by: FAMILY MEDICINE

## 2024-01-09 PROCEDURE — 1036F TOBACCO NON-USER: CPT | Performed by: FAMILY MEDICINE

## 2024-01-09 PROCEDURE — 3078F DIAST BP <80 MM HG: CPT | Performed by: FAMILY MEDICINE

## 2024-01-09 PROCEDURE — G0439 PPPS, SUBSEQ VISIT: HCPCS | Performed by: FAMILY MEDICINE

## 2024-01-09 PROCEDURE — 99397 PER PM REEVAL EST PAT 65+ YR: CPT | Performed by: FAMILY MEDICINE

## 2024-01-09 PROCEDURE — 3075F SYST BP GE 130 - 139MM HG: CPT | Performed by: FAMILY MEDICINE

## 2024-01-09 PROCEDURE — 1170F FXNL STATUS ASSESSED: CPT | Performed by: FAMILY MEDICINE

## 2024-01-09 PROCEDURE — G0446 INTENS BEHAVE THER CARDIO DX: HCPCS | Performed by: FAMILY MEDICINE

## 2024-01-09 PROCEDURE — 82306 VITAMIN D 25 HYDROXY: CPT

## 2024-01-09 PROCEDURE — 80053 COMPREHEN METABOLIC PANEL: CPT

## 2024-01-09 PROCEDURE — 36415 COLL VENOUS BLD VENIPUNCTURE: CPT

## 2024-01-09 PROCEDURE — 85025 COMPLETE CBC W/AUTO DIFF WBC: CPT

## 2024-01-09 PROCEDURE — 1159F MED LIST DOCD IN RCRD: CPT | Performed by: FAMILY MEDICINE

## 2024-01-09 PROCEDURE — 1126F AMNT PAIN NOTED NONE PRSNT: CPT | Performed by: FAMILY MEDICINE

## 2024-01-09 ASSESSMENT — PATIENT HEALTH QUESTIONNAIRE - PHQ9
1. LITTLE INTEREST OR PLEASURE IN DOING THINGS: NOT AT ALL
1. LITTLE INTEREST OR PLEASURE IN DOING THINGS: NOT AT ALL
SUM OF ALL RESPONSES TO PHQ9 QUESTIONS 1 AND 2: 0
2. FEELING DOWN, DEPRESSED OR HOPELESS: NOT AT ALL
2. FEELING DOWN, DEPRESSED OR HOPELESS: NOT AT ALL
SUM OF ALL RESPONSES TO PHQ9 QUESTIONS 1 AND 2: 0
2. FEELING DOWN, DEPRESSED OR HOPELESS: NOT AT ALL
1. LITTLE INTEREST OR PLEASURE IN DOING THINGS: NOT AT ALL
2. FEELING DOWN, DEPRESSED OR HOPELESS: NOT AT ALL
1. LITTLE INTEREST OR PLEASURE IN DOING THINGS: NOT AT ALL

## 2024-01-09 ASSESSMENT — ACTIVITIES OF DAILY LIVING (ADL)
DOING_HOUSEWORK: INDEPENDENT
GROCERY_SHOPPING: INDEPENDENT
DRESSING: INDEPENDENT
BATHING: INDEPENDENT
TAKING_MEDICATION: INDEPENDENT
MANAGING_FINANCES: INDEPENDENT

## 2024-01-09 ASSESSMENT — ENCOUNTER SYMPTOMS
OCCASIONAL FEELINGS OF UNSTEADINESS: 1
JOINT SWELLING: 1
ARTHRALGIAS: 1
DEPRESSION: 0
LOSS OF SENSATION IN FEET: 0

## 2024-01-09 NOTE — PROGRESS NOTES
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    HPI     Review of Systems   Musculoskeletal:  Positive for arthralgias and joint swelling.   All other systems reviewed and are negative.      Objective   /64   Pulse 85   Temp 36.1 °C (96.9 °F)   Ht 1.575 m (5' 2\")   Wt 79.8 kg (176 lb)   BMI 32.19 kg/m²     Physical Exam  Cardiovascular:      Rate and Rhythm: Regular rhythm.      Heart sounds: Murmur heard.   Abdominal:      Palpations: Abdomen is soft.   Musculoskeletal:      Cervical back: Normal range of motion.   Neurological:      General: No focal deficit present.      Mental Status: She is alert.   Psychiatric:         Mood and Affect: Mood normal.         Assessment/Plan   This is a 83-year-old female patient who is here for her Medicare well visit    Depression screen is negative    She says she has advanced directives power of  and living will with her daughter she promised to fax the documents to be scanned to the chart      I discussed and assess the  risk factors for cardiovascular disease.  I educated patient on a healthier behavior lifestyle changes.   I advised patient to walk at least 30 minutes a day 5 days a week.  I educated on healthy eating habits and also taking a baby aspirin to avoid cardiovascular accident ----I reviewed her cardiac score; also reviewed the cardiology notes    Routine labs were ordered    Her mammogram done this year was normal    She has ulcerative colitis being followed by gastroenterology at [facility]    For her left knee pain referred her to orthopedic but also I prescribed a compound gel from compound pharmacy in [place]    Advised patient to keep an appointment for 6 months         "

## 2024-01-15 DIAGNOSIS — I10 HYPERTENSION, UNSPECIFIED TYPE: ICD-10-CM

## 2024-01-15 RX ORDER — AMLODIPINE BESYLATE 10 MG/1
TABLET ORAL
Qty: 100 TABLET | Refills: 2 | Status: SHIPPED | OUTPATIENT
Start: 2024-01-15

## 2024-01-29 ASSESSMENT — DERMATOLOGY QUALITY OF LIFE (QOL) ASSESSMENT
RATE HOW BOTHERED YOU ARE BY SYMPTOMS OF YOUR SKIN PROBLEM (EG, ITCHING, STINGING BURNING, HURTING OR SKIN IRRITATION): 2
RATE HOW EMOTIONALLY BOTHERED YOU ARE BY YOUR SKIN PROBLEM (FOR EXAMPLE, WORRY, EMBARRASSMENT, FRUSTRATION): 0 - NEVER BOTHERED
WHAT SINGLE SKIN CONDITION LISTED BELOW IS THE PATIENT ANSWERING THE QUALITY-OF-LIFE ASSESSMENT QUESTIONS ABOUT: DERMATITIS
WHAT SINGLE SKIN CONDITION LISTED BELOW IS THE PATIENT ANSWERING THE QUALITY-OF-LIFE ASSESSMENT QUESTIONS ABOUT: DERMATITIS
DATE THE QUALITY-OF-LIFE ASSESSMENT WAS COMPLETED: 66866
RATE HOW EMOTIONALLY BOTHERED YOU ARE BY YOUR SKIN PROBLEM (FOR EXAMPLE, WORRY, EMBARRASSMENT, FRUSTRATION): 0 - NEVER BOTHERED
RATE HOW BOTHERED YOU ARE BY EFFECTS OF YOUR SKIN PROBLEMS ON YOUR ACTIVITIES (EG, GOING OUT, ACCOMPLISHING WHAT YOU WANT, WORK ACTIVITIES OR YOUR RELATIONSHIPS WITH OTHERS): 0 - NEVER BOTHERED
RATE HOW BOTHERED YOU ARE BY SYMPTOMS OF YOUR SKIN PROBLEM (EG, ITCHING, STINGING BURNING, HURTING OR SKIN IRRITATION): 2
RATE HOW BOTHERED YOU ARE BY SYMPTOMS OF YOUR SKIN PROBLEM (EG, ITCHING, STINGING BURNING, HURTING OR SKIN IRRITATION): 2
DATE THE QUALITY-OF-LIFE ASSESSMENT WAS COMPLETED: 66866
WHAT SINGLE SKIN CONDITION LISTED BELOW IS THE PATIENT ANSWERING THE QUALITY-OF-LIFE ASSESSMENT QUESTIONS ABOUT: DERMATITIS
RATE HOW BOTHERED YOU ARE BY EFFECTS OF YOUR SKIN PROBLEMS ON YOUR ACTIVITIES (EG, GOING OUT, ACCOMPLISHING WHAT YOU WANT, WORK ACTIVITIES OR YOUR RELATIONSHIPS WITH OTHERS): 0 - NEVER BOTHERED
RATE HOW EMOTIONALLY BOTHERED YOU ARE BY YOUR SKIN PROBLEM (FOR EXAMPLE, WORRY, EMBARRASSMENT, FRUSTRATION): 0 - NEVER BOTHERED
RATE HOW BOTHERED YOU ARE BY EFFECTS OF YOUR SKIN PROBLEMS ON YOUR ACTIVITIES (EG, GOING OUT, ACCOMPLISHING WHAT YOU WANT, WORK ACTIVITIES OR YOUR RELATIONSHIPS WITH OTHERS): 0 - NEVER BOTHERED

## 2024-01-29 ASSESSMENT — PATIENT GLOBAL ASSESSMENT (PGA): WHAT IS THE PGA: PATIENT GLOBAL ASSESSMENT:  2 - MILD

## 2024-01-31 ENCOUNTER — PHARMACY VISIT (OUTPATIENT)
Dept: PHARMACY | Facility: CLINIC | Age: 84
End: 2024-01-31
Payer: COMMERCIAL

## 2024-01-31 ENCOUNTER — APPOINTMENT (OUTPATIENT)
Dept: DERMATOLOGY | Facility: CLINIC | Age: 84
End: 2024-01-31
Payer: MEDICARE

## 2024-01-31 PROCEDURE — RXMED WILLOW AMBULATORY MEDICATION CHARGE

## 2024-02-02 ENCOUNTER — OFFICE VISIT (OUTPATIENT)
Dept: ORTHOPEDIC SURGERY | Facility: CLINIC | Age: 84
End: 2024-02-02
Payer: MEDICARE

## 2024-02-02 ENCOUNTER — HOSPITAL ENCOUNTER (OUTPATIENT)
Dept: RADIOLOGY | Facility: CLINIC | Age: 84
Discharge: HOME | End: 2024-02-02
Payer: MEDICARE

## 2024-02-02 VITALS — BODY MASS INDEX: 31.01 KG/M2 | HEIGHT: 63 IN | WEIGHT: 175 LBS

## 2024-02-02 DIAGNOSIS — M25.562 LEFT KNEE PAIN, UNSPECIFIED CHRONICITY: ICD-10-CM

## 2024-02-02 DIAGNOSIS — G89.29 CHRONIC PAIN OF LEFT KNEE: ICD-10-CM

## 2024-02-02 DIAGNOSIS — M17.10 ARTHRITIS OF KNEE: Primary | ICD-10-CM

## 2024-02-02 DIAGNOSIS — M25.562 CHRONIC PAIN OF LEFT KNEE: ICD-10-CM

## 2024-02-02 PROCEDURE — 1126F AMNT PAIN NOTED NONE PRSNT: CPT | Performed by: ORTHOPAEDIC SURGERY

## 2024-02-02 PROCEDURE — 73562 X-RAY EXAM OF KNEE 3: CPT | Mod: LEFT SIDE | Performed by: RADIOLOGY

## 2024-02-02 PROCEDURE — 1159F MED LIST DOCD IN RCRD: CPT | Performed by: ORTHOPAEDIC SURGERY

## 2024-02-02 PROCEDURE — 2500000005 HC RX 250 GENERAL PHARMACY W/O HCPCS: Performed by: ORTHOPAEDIC SURGERY

## 2024-02-02 PROCEDURE — 20610 DRAIN/INJ JOINT/BURSA W/O US: CPT | Performed by: ORTHOPAEDIC SURGERY

## 2024-02-02 PROCEDURE — 73562 X-RAY EXAM OF KNEE 3: CPT | Mod: LT

## 2024-02-02 PROCEDURE — 99214 OFFICE O/P EST MOD 30 MIN: CPT | Performed by: ORTHOPAEDIC SURGERY

## 2024-02-02 PROCEDURE — 1036F TOBACCO NON-USER: CPT | Performed by: ORTHOPAEDIC SURGERY

## 2024-02-02 PROCEDURE — 99204 OFFICE O/P NEW MOD 45 MIN: CPT | Performed by: ORTHOPAEDIC SURGERY

## 2024-02-02 PROCEDURE — 2500000004 HC RX 250 GENERAL PHARMACY W/ HCPCS (ALT 636 FOR OP/ED): Performed by: ORTHOPAEDIC SURGERY

## 2024-02-02 RX ORDER — LIDOCAINE HYDROCHLORIDE 20 MG/ML
2 INJECTION, SOLUTION INFILTRATION; PERINEURAL
Status: COMPLETED | OUTPATIENT
Start: 2024-02-02 | End: 2024-02-02

## 2024-02-02 RX ORDER — METHYLPREDNISOLONE ACETATE 40 MG/ML
40 INJECTION, SUSPENSION INTRA-ARTICULAR; INTRALESIONAL; INTRAMUSCULAR; SOFT TISSUE
Status: COMPLETED | OUTPATIENT
Start: 2024-02-02 | End: 2024-02-02

## 2024-02-02 RX ADMIN — LIDOCAINE HYDROCHLORIDE 2 ML: 20 INJECTION, SOLUTION INFILTRATION; PERINEURAL at 11:03

## 2024-02-02 RX ADMIN — METHYLPREDNISOLONE ACETATE 40 MG: 40 INJECTION, SUSPENSION INTRALESIONAL; INTRAMUSCULAR; INTRASYNOVIAL; SOFT TISSUE at 11:03

## 2024-02-02 ASSESSMENT — PAIN - FUNCTIONAL ASSESSMENT: PAIN_FUNCTIONAL_ASSESSMENT: 0-10

## 2024-02-02 ASSESSMENT — PAIN SCALES - GENERAL: PAINLEVEL_OUTOF10: 6

## 2024-02-02 ASSESSMENT — PAIN DESCRIPTION - DESCRIPTORS: DESCRIPTORS: THROBBING;ACHING;SHARP

## 2024-02-02 NOTE — PROGRESS NOTES
"83-year-old female here for left knee pain.  Had increasing left knee pain over the last 3 months.  Denies any injury.  No falls.  Is never had injections in her knees.  Occasionally wants to \"give out\".  Most the pains over the medial aspect of the knee.  She does have difficulty with stair climbing.  History of bilateral quad rupture in 1986 after a fall down the stairs.  No other surgeries.  Well-controlled type 2 diabetes.    WD/WN thin female  A+O X3  No lymphedema  Inspection of both knees shows symmetric alignment.   No flexion contracture.   Left knee medial compartment crepitus through range of motion.   5/5 strength in quads and hamstrings.   Stable varus/valgus stress.   (-) Lachman.   (-) Chelo.   Sensation intact to LT.   Good pulses in both legs.   Small left knee effusion.   No erythema.    I personally reviewed the following radiographic exams: Left knee shows near bone-on-bone medial compartment arthrosis.  Moderate patellofemoral arthritis.  Calcification distal quad tendon.    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    Plan: Discussed nonoperative and operative options in detail.   Risk and benefits discussed in detail. All questions answered today.  Recovery timeline and expectations discussed in detail.  Discussed treatment options.  Discussed cortisone injection to relieve pain.  Will monitor her response to the shot.  Discussed possible gel injections.  Discussed possible knee replacement in the future.  After informed consent under sterile conditions the left knee was injected with a combination of  2cc 2% lidocaine and 40mg Methylprednisolone. Risks and benefits were discussed in detail.  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    L Inj/Asp: L knee on 2/2/2024 11:03 AM  Indications: pain, joint swelling and diagnostic evaluation  Details: 21 G needle, anterolateral approach  Medications: 40 mg methylPREDNISolone acetate 40 mg/mL; 2 mL " lidocaine 20 mg/mL (2 %)  Outcome: tolerated well, no immediate complications  Procedure, treatment alternatives, risks and benefits explained, specific risks discussed. Consent was given by the patient. Immediately prior to procedure a time out was called to verify the correct patient, procedure, equipment, support staff and site/side marked as required. Patient was prepped and draped in the usual sterile fashion.

## 2024-02-11 DIAGNOSIS — E78.9 LIPID DISORDER: Primary | ICD-10-CM

## 2024-02-12 RX ORDER — ATORVASTATIN CALCIUM 10 MG/1
10 TABLET, FILM COATED ORAL DAILY
Qty: 100 TABLET | Refills: 2 | Status: SHIPPED | OUTPATIENT
Start: 2024-02-12

## 2024-02-12 ASSESSMENT — DERMATOLOGY QUALITY OF LIFE (QOL) ASSESSMENT
RATE HOW BOTHERED YOU ARE BY EFFECTS OF YOUR SKIN PROBLEMS ON YOUR ACTIVITIES (EG, GOING OUT, ACCOMPLISHING WHAT YOU WANT, WORK ACTIVITIES OR YOUR RELATIONSHIPS WITH OTHERS): 0 - NEVER BOTHERED
RATE HOW EMOTIONALLY BOTHERED YOU ARE BY YOUR SKIN PROBLEM (FOR EXAMPLE, WORRY, EMBARRASSMENT, FRUSTRATION): 0 - NEVER BOTHERED
RATE HOW BOTHERED YOU ARE BY EFFECTS OF YOUR SKIN PROBLEMS ON YOUR ACTIVITIES (EG, GOING OUT, ACCOMPLISHING WHAT YOU WANT, WORK ACTIVITIES OR YOUR RELATIONSHIPS WITH OTHERS): 0 - NEVER BOTHERED
WHAT SINGLE SKIN CONDITION LISTED BELOW IS THE PATIENT ANSWERING THE QUALITY-OF-LIFE ASSESSMENT QUESTIONS ABOUT: DERMATITIS
RATE HOW BOTHERED YOU ARE BY SYMPTOMS OF YOUR SKIN PROBLEM (EG, ITCHING, STINGING BURNING, HURTING OR SKIN IRRITATION): 2
RATE HOW EMOTIONALLY BOTHERED YOU ARE BY YOUR SKIN PROBLEM (FOR EXAMPLE, WORRY, EMBARRASSMENT, FRUSTRATION): 0 - NEVER BOTHERED
DATE THE QUALITY-OF-LIFE ASSESSMENT WAS COMPLETED: 66866
RATE HOW BOTHERED YOU ARE BY SYMPTOMS OF YOUR SKIN PROBLEM (EG, ITCHING, STINGING BURNING, HURTING OR SKIN IRRITATION): 2
WHAT SINGLE SKIN CONDITION LISTED BELOW IS THE PATIENT ANSWERING THE QUALITY-OF-LIFE ASSESSMENT QUESTIONS ABOUT: DERMATITIS

## 2024-02-12 ASSESSMENT — PATIENT GLOBAL ASSESSMENT (PGA): WHAT IS THE PGA: PATIENT GLOBAL ASSESSMENT:  2 - MILD

## 2024-02-14 ENCOUNTER — OFFICE VISIT (OUTPATIENT)
Dept: DERMATOLOGY | Facility: CLINIC | Age: 84
End: 2024-02-14
Payer: MEDICARE

## 2024-02-14 DIAGNOSIS — Z79.899 OTHER LONG TERM (CURRENT) DRUG THERAPY: ICD-10-CM

## 2024-02-14 DIAGNOSIS — L12.0 BULLOUS PEMPHIGOID (MULTI): Primary | ICD-10-CM

## 2024-02-14 PROCEDURE — 1159F MED LIST DOCD IN RCRD: CPT | Performed by: DERMATOLOGY

## 2024-02-14 PROCEDURE — 99213 OFFICE O/P EST LOW 20 MIN: CPT | Performed by: DERMATOLOGY

## 2024-02-14 PROCEDURE — 1126F AMNT PAIN NOTED NONE PRSNT: CPT | Performed by: DERMATOLOGY

## 2024-02-14 PROCEDURE — 1036F TOBACCO NON-USER: CPT | Performed by: DERMATOLOGY

## 2024-02-14 RX ORDER — HYDROCORTISONE 25 MG/G
OINTMENT TOPICAL
Qty: 20 G | Refills: 11 | Status: SHIPPED | OUTPATIENT
Start: 2024-02-14 | End: 2024-05-30 | Stop reason: WASHOUT

## 2024-02-14 RX ORDER — PREDNISONE 1 MG/1
TABLET ORAL
Qty: 30 TABLET | Refills: 3 | Status: SHIPPED | OUTPATIENT
Start: 2024-02-14 | End: 2024-05-30 | Stop reason: WASHOUT

## 2024-02-14 NOTE — PROGRESS NOTES
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     Last seen in February. She also receives chronic infusions for her anemia.     Patient reports that she flares 1-2x a month. Topical steroids have been effective in these areas and would clear it up. Hydrocortisone use for break outs and triamcionlone for pruritus.     Review of Systems:  No other skin or systemic complaints other than what is documented elsewhere in the note.    The following portions of the chart were reviewed this encounter and updated as appropriate:          Skin Cancer History  No skin cancer on file.      Specialty Problems          Dermatology Problems    Pemphigus vulgaris     Last Assessment & Plan: Formatting of this note might be different from the original. Assessment: Stable, reportedly related to Tamoxifen use. PLAN: Continue Azathioprine. Resume Prednisone F/u with Dermatology         Bullous pemphigoid    Rash and other nonspecific skin eruption    Folliculitis    Pemphigoid    Psoriasis    SCC (squamous cell carcinoma)     SCC SERIES         Urticaria    Dermatitis, eczematoid        Objective   Well appearing patient in no apparent distress; mood and affect are within normal limits.    A focused skin examination was performed. All findings within normal limits unless otherwise noted below.    No active lesions today.           Assessment/Plan   Bullous pemphigoid    Bullous pemphigoid, stable, with mild flares about once per month   - Flares quickly responding to topical hydrocortisone.   - Since stable on prednisone 5 mg, TAPER DOWN TO 4 mg daily. Discussed with patient to reach out if she is flaring. If unable to reach us quickly, discussed that it is okay to increase back to 5 mg in the mean time.   - Continue hydrocortisone 2.5% ointment BID as needed for flares. Refills  today.   - Continue triamcinolone 0.1% ointment BID as needed for flares.   - F/u CMP and CBC w/ diff. CBC 9/2023 relatively stable  - Follows with hematology and receives iron infusions for anemia   - Follow-up in 2 months     Related Procedures  Follow Up In Dermatology - Established Patient    Related Medications  predniSONE (Deltasone) 1 mg tablet  Take 4 tablets (4mg) every morning.    hydrocortisone 2.5 % ointment  Use twice a day as needed to affected areas.    Other long term (current) drug therapy    Refer to plan above.       Follow up in 2 months for BP.     My DO Alee  Department of Dermatology    I saw and evaluated the patient. I personally obtained the key and critical portions of the history and physical exam or was physically present for key and critical portions performed by the resident/fellow. I reviewed the resident/fellow's documentation and discussed the patient with the resident/fellow. I agree with the resident/fellow's medical decision making as documented in the note.    Negrito Young MD PhD

## 2024-03-12 ENCOUNTER — PHARMACY VISIT (OUTPATIENT)
Dept: PHARMACY | Facility: CLINIC | Age: 84
End: 2024-03-12
Payer: COMMERCIAL

## 2024-03-12 PROCEDURE — RXMED WILLOW AMBULATORY MEDICATION CHARGE

## 2024-04-04 PROCEDURE — RXMED WILLOW AMBULATORY MEDICATION CHARGE

## 2024-04-05 ENCOUNTER — PHARMACY VISIT (OUTPATIENT)
Dept: PHARMACY | Facility: CLINIC | Age: 84
End: 2024-04-05
Payer: COMMERCIAL

## 2024-04-17 ENCOUNTER — OFFICE VISIT (OUTPATIENT)
Dept: DERMATOLOGY | Facility: CLINIC | Age: 84
End: 2024-04-17
Payer: MEDICARE

## 2024-04-17 DIAGNOSIS — L12.0 BULLOUS PEMPHIGOID (MULTI): Primary | ICD-10-CM

## 2024-04-17 DIAGNOSIS — Z79.899 OTHER LONG TERM (CURRENT) DRUG THERAPY: ICD-10-CM

## 2024-04-17 PROCEDURE — 1159F MED LIST DOCD IN RCRD: CPT | Performed by: DERMATOLOGY

## 2024-04-17 PROCEDURE — 99213 OFFICE O/P EST LOW 20 MIN: CPT | Performed by: DERMATOLOGY

## 2024-04-17 RX ORDER — HYDROCORTISONE 25 MG/G
1 OINTMENT TOPICAL 2 TIMES DAILY
Qty: 28.35 G | Refills: 11 | Status: SHIPPED | OUTPATIENT
Start: 2024-04-17 | End: 2024-05-30 | Stop reason: WASHOUT

## 2024-04-17 RX ORDER — PREDNISONE 1 MG/1
4 TABLET ORAL EVERY MORNING
Qty: 360 TABLET | Refills: 3 | Status: SHIPPED | OUTPATIENT
Start: 2024-04-17 | End: 2025-04-12

## 2024-04-17 RX ORDER — TRIAMCINOLONE ACETONIDE 1 MG/G
CREAM TOPICAL
Qty: 80 G | Refills: 11 | Status: SHIPPED | OUTPATIENT
Start: 2024-04-17

## 2024-04-17 NOTE — PROGRESS NOTES
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 Hydrocortisone and Triamcinolone cream ).     Last seen on 2/2022 at that time prednisone was held at 5 mg and Imuran at 50 mg daily. Currently, she is on 4 mg starting this month and has remained stable.  Recent CBC on 4/12/2024 demonstrated a mildly elevated WBC, elevated platelets, anemia. She being monitored by hematology. CMP on 3/11/2024 were stable.     She only had 1 recent flare which resolved quickly with hydrocortisone. She would like refills of her topicals.     Review of Systems:  No other skin or systemic complaints other than what is documented elsewhere in the note.    The following portions of the chart were reviewed this encounter and updated as appropriate:          Skin Cancer History  No skin cancer on file.      Specialty Problems          Dermatology Problems    Pemphigus vulgaris (Multi)     Last Assessment & Plan: Formatting of this note might be different from the original. Assessment: Stable, reportedly related to Tamoxifen use. PLAN: Continue Azathioprine. Resume Prednisone F/u with Dermatology         Bullous pemphigoid (Multi)    Rash and other nonspecific skin eruption    Folliculitis    Pemphigoid (Multi)    Psoriasis    SCC (squamous cell carcinoma)     SCC SERIES         Urticaria    Dermatitis, eczematoid        Objective   Well appearing patient in no apparent distress; mood and affect are within normal limits.    A focused skin examination was performed. All findings within normal limits unless otherwise noted below.    No active lesions     No active           Assessment/Plan   Bullous pemphigoid (Multi)    Bullous pemphigoid- stable  - Flares quickly responding to topical hydrocortisone. Will refill this today.   - CONTINUE prednisone 4 mg daily. Refills provided today. Continue Calcium and Vitamin D  supplementation.   - Continue Azathioprine 50mg PO daily  - Continue triamcinolone 0.1% ointment BID as needed for flares; Rx for hydrocortisone 2.5% ointment to use on forhead flares  - Recent CBC on 4/12/2024 demonstrated a mildly elevated WBC, elevated platelets, anemia. She being monitored by hematology. CMP on 3/11/2024 were stable.   - RTC 2 months    Related Procedures  Follow Up In Dermatology - Established Patient  Follow Up In Dermatology - Established Patient    Related Medications  predniSONE (Deltasone) 1 mg tablet  Take 4 tablets (4mg) every morning.    hydrocortisone 2.5 % ointment  Use twice a day as needed to affected areas.    hydrocortisone 2.5 % ointment  Apply 1 Application topically 2 times a day.    triamcinolone (Kenalog) 0.1 % cream  Twice a day to affected area. Avoid face, groin, and armpits.    predniSONE (Deltasone) 1 mg tablet  Take 4 tablets (4 mg) by mouth once daily in the morning.    Other long term (current) drug therapy    Refer to plan above    Related Procedures  Follow Up In Dermatology - Established Patient    Related Medications  predniSONE (Deltasone) 1 mg tablet  Take 4 tablets (4 mg) by mouth once daily in the morning.    My Alee, DO  Department of Dermatology    I saw and evaluated the patient. I personally obtained the key and critical portions of the history and physical exam or was physically present for key and critical portions performed by the resident/fellow. I reviewed the resident/fellow's documentation and discussed the patient with the resident/fellow. I agree with the resident/fellow's medical decision making as documented in the note.    Negrito Young MD PhD

## 2024-04-23 DIAGNOSIS — R13.10 DYSPHAGIA, UNSPECIFIED TYPE: ICD-10-CM

## 2024-04-23 RX ORDER — PANTOPRAZOLE SODIUM 40 MG/1
TABLET, DELAYED RELEASE ORAL
Qty: 100 TABLET | Refills: 2 | Status: SHIPPED | OUTPATIENT
Start: 2024-04-23

## 2024-04-24 ENCOUNTER — PHARMACY VISIT (OUTPATIENT)
Dept: PHARMACY | Facility: CLINIC | Age: 84
End: 2024-04-24
Payer: COMMERCIAL

## 2024-04-24 PROCEDURE — RXMED WILLOW AMBULATORY MEDICATION CHARGE

## 2024-05-24 ENCOUNTER — PHARMACY VISIT (OUTPATIENT)
Dept: PHARMACY | Facility: CLINIC | Age: 84
End: 2024-05-24
Payer: COMMERCIAL

## 2024-05-24 PROCEDURE — RXMED WILLOW AMBULATORY MEDICATION CHARGE

## 2024-05-29 RX ORDER — NEOMYCIN SULFATE, POLYMYXIN B SULFATE, AND DEXAMETHASONE 3.5; 10000; 1 MG/G; [USP'U]/G; MG/G
1 OINTMENT OPHTHALMIC 4 TIMES DAILY
COMMUNITY
Start: 2024-04-10

## 2024-05-29 RX ORDER — CHLORHEXIDINE GLUCONATE ORAL RINSE 1.2 MG/ML
SOLUTION DENTAL
COMMUNITY
Start: 2024-03-07

## 2024-05-29 RX ORDER — PREDNISOLONE ACETATE 10 MG/ML
SUSPENSION/ DROPS OPHTHALMIC
COMMUNITY
Start: 2024-04-10

## 2024-05-29 RX ORDER — ACETAZOLAMIDE 250 MG/1
TABLET ORAL
COMMUNITY
Start: 2024-04-11 | End: 2024-05-30 | Stop reason: WASHOUT

## 2024-05-29 RX ORDER — DORZOLAMIDE HYDROCHLORIDE AND TIMOLOL MALEATE PRESERVATIVE FREE 20; 5 MG/ML; MG/ML
SOLUTION/ DROPS OPHTHALMIC
COMMUNITY
Start: 2024-01-15

## 2024-05-30 ENCOUNTER — OFFICE VISIT (OUTPATIENT)
Dept: CARDIOLOGY | Facility: CLINIC | Age: 84
End: 2024-05-30
Payer: MEDICARE

## 2024-05-30 VITALS
HEART RATE: 74 BPM | HEIGHT: 63 IN | OXYGEN SATURATION: 97 % | SYSTOLIC BLOOD PRESSURE: 124 MMHG | WEIGHT: 174.25 LBS | BODY MASS INDEX: 30.88 KG/M2 | DIASTOLIC BLOOD PRESSURE: 55 MMHG

## 2024-05-30 DIAGNOSIS — R93.1 AGATSTON CAC SCORE 200-399: ICD-10-CM

## 2024-05-30 DIAGNOSIS — I10 PRIMARY HYPERTENSION: ICD-10-CM

## 2024-05-30 DIAGNOSIS — E78.5 DYSLIPIDEMIA: ICD-10-CM

## 2024-05-30 DIAGNOSIS — I25.10 CORONARY ARTERY DISEASE INVOLVING NATIVE HEART, UNSPECIFIED VESSEL OR LESION TYPE, UNSPECIFIED WHETHER ANGINA PRESENT: Primary | ICD-10-CM

## 2024-05-30 PROCEDURE — 1126F AMNT PAIN NOTED NONE PRSNT: CPT | Performed by: INTERNAL MEDICINE

## 2024-05-30 PROCEDURE — 99213 OFFICE O/P EST LOW 20 MIN: CPT | Performed by: INTERNAL MEDICINE

## 2024-05-30 PROCEDURE — 1036F TOBACCO NON-USER: CPT | Performed by: INTERNAL MEDICINE

## 2024-05-30 PROCEDURE — 1160F RVW MEDS BY RX/DR IN RCRD: CPT | Performed by: INTERNAL MEDICINE

## 2024-05-30 PROCEDURE — 1159F MED LIST DOCD IN RCRD: CPT | Performed by: INTERNAL MEDICINE

## 2024-05-30 PROCEDURE — 3078F DIAST BP <80 MM HG: CPT | Performed by: INTERNAL MEDICINE

## 2024-05-30 PROCEDURE — 3074F SYST BP LT 130 MM HG: CPT | Performed by: INTERNAL MEDICINE

## 2024-05-30 ASSESSMENT — COLUMBIA-SUICIDE SEVERITY RATING SCALE - C-SSRS
2. HAVE YOU ACTUALLY HAD ANY THOUGHTS OF KILLING YOURSELF?: NO
1. IN THE PAST MONTH, HAVE YOU WISHED YOU WERE DEAD OR WISHED YOU COULD GO TO SLEEP AND NOT WAKE UP?: NO
6. HAVE YOU EVER DONE ANYTHING, STARTED TO DO ANYTHING, OR PREPARED TO DO ANYTHING TO END YOUR LIFE?: NO

## 2024-05-30 ASSESSMENT — PATIENT HEALTH QUESTIONNAIRE - PHQ9
2. FEELING DOWN, DEPRESSED OR HOPELESS: NOT AT ALL
SUM OF ALL RESPONSES TO PHQ9 QUESTIONS 1 AND 2: 0
1. LITTLE INTEREST OR PLEASURE IN DOING THINGS: NOT AT ALL

## 2024-05-30 ASSESSMENT — ENCOUNTER SYMPTOMS
LOSS OF SENSATION IN FEET: 0
OCCASIONAL FEELINGS OF UNSTEADINESS: 0
DEPRESSION: 0

## 2024-05-30 ASSESSMENT — PAIN SCALES - GENERAL: PAINLEVEL: 0-NO PAIN

## 2024-05-30 NOTE — PROGRESS NOTES
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 Last seen in 2023.     Since our last visit, Rev. Chilel has no active cardiac symptoms of chest pain, PND, orthopnea, BERNARDA, palpitations, syncope, or claudication. Dyspnea on exertion with walking 1-2 blocks that correlates with more knee arthritis. Is using a cane for support now.     She had recent hospitalization in early  for acute lower GI bleeding from her colon with blood transfusion (Hgb barbara 6.4).      Has reduced processed carbohydrates, increased fruits and vegetables, stopped eating fried foods. Is tolerating atorvastatin 10 mg a day without side effects and LDL < 70 on 2023 lipid panel. She is taking Jardiance 25 mg a day for cardioprotection and glycemic control.     She had a CT calcium score done 10/3/2019 with total of 337. MEJIA 10 year risk score for MI was 29% based on 2020 risk factors (diabetes type 2, family history, hypertension, dyslipidemia) prior to modification by treatment plan.     Past Medical History:  1. Coronary arteriosclerosis: 10/3/2019 CT calcium score 337; MEJIA 10 year risk score for MI 29% (high risk).  2. Dyslipidemia: 8/15/2019: ; ; HDL 29; LDL 87  3. Diabetes Mellitus, type 2  4. Hypertension  5. Crohns' disease  6. h/o Breast cancer  7. Bullous pemphigus  8. Asthma     Social History:  Never a smoker     Family History:  Mother had heart disease and ESRD. 2 brothers had MI and ESRD. Sister recently  of a heart attack.     Review of Systems    A 14 point review of systems was asked. All questions were negative except for pertinent positives listed in the HPI.      Objective   Physical Exam  BP Readings from Last 3 Encounters:   24 124/55   24 130/64   23 132/66      Wt Readings from Last 3 Encounters:   24 79 kg (174 " "lb 4 oz)   24 79.4 kg (175 lb)   24 79.8 kg (176 lb)      BMI: Estimated body mass index is 31.36 kg/m² as calculated from the following:    Height as of this encounter: 1.588 m (5' 2.5\").    Weight as of this encounter: 79 kg (174 lb 4 oz).  BSA: Estimated body surface area is 1.87 meters squared as calculated from the following:    Height as of this encounter: 1.588 m (5' 2.5\").    Weight as of this encounter: 79 kg (174 lb 4 oz).    General: no acute distress  HEENT: EOMI, no scleral icterus.  Lungs: Clear to auscultation bilaterally without wheezing, rales, or rhonchi.  Cardiovascular: Regular rhythm and rate. Normal S1 and S2. No murmurs, rubs, or gallops are appreciated. JVP normal.  Abdomen: Soft, nontender, nondistended. Bowel sounds present.  Extremities: Warm and well perfused with equal 2+ pulses bilaterally.  No edema present.  Neurologic: Alert and oriented x3.    I have personally reviewed the following images and laboratory findings:  Last echocardiogram:   n/a    Last cath / stress test / CACS:   10/3/2019 CT calcium score 337    Most recent EC2023 ECG: Sinus rhythm, 76 bpm, normal ECG. Personally reviewed in office.    Lab Results   Component Value Date    CHOL 163 2024    CHOL 144 2023    CHOL 160 2022     Lab Results   Component Value Date    HDL 38.7 2024    HDL 40.2 2023    HDL 40.4 2022     Lab Results   Component Value Date    LDLCALC 77 2024     Lab Results   Component Value Date    TRIG 236 (H) 2024    TRIG 175 (H) 2023    TRIG 239 (H) 2022     No components found for: \"CHOLHDL\"   2023 LDL 69     Assessment/Plan   1. CAD:  10/2019 CT calcium score is 337 with MEJIA 10 year risk score for MI of 29% (high risk)  Risk factors include: type 2 diabetes, hypertension, dyslipidemia (low HDL, high TGs), family history, and inflammatory autoimmune disease (Crohns disease).  - hold aspirin 81 mg a day completely for " recent 2023 colonic bleeding with hospitalization and prior 2021 GI bleeding history  - continue atorvastatin 10 mg a day. LDL near goal < 70 on January 2024 lipid panel. Continue statin for reduction of coronary artery plaque volume.   - continue blood pressure control with amlodipine 10 mg  - continue Jardiance 10 mg a day. Continuing this SGLT2 inhibitor will reduce CV risk by 20% given diabetes type 2 status and CT calcium score evidence of atherosclerosis.      2. Cardiac murmur:  Flow murmur at the aortic valve position radiating across precordium and to carotids. This sounds more like an aortic sclerosis murmur with hyperdynamic LV function rather than aortic stenosis. Will monitor and order echocardiogram if murmur quality changes over time. There is left carotid sound referral but both ICA < 50% on 3/22/2022 duplex study.    3. Pedal edema:  Recent 7 lb weight retention with 1+ BERNARDA. Will trial furosemide 20 mg a day as needed for 2-3 lb weight gain.     Follow up with Dr. Hilton in 6 months            SIGNATURE: Rhys Hilton MD PATIENT NAME: Rand Chilel   DATE/TIME: May 30, 2024 11:07 AM MRN: [id number]

## 2024-05-30 NOTE — PATIENT INSTRUCTIONS
You were seen in the [facility] for your for coronary atherosclerosis (hardening of the heart arteries).      Your ECG today is normal. Your heart exam was normal except for a flow murmur that is unchanged from our last visit together. We will monitor murmur at follow up visits and order an echocardiogram (ultrasound of the heart) if it changes.     Your October 2019 CT calcium score was high at 337. Your 10 year risk of having a heart attack with this calcium score is 29% based on your current risk factors (diabetes type 2, family history, blood pressure, cholesterol numbers).     I recommend that we do the following to reduce your heart attack risk:  1. I recommended that we stop using aspirin for you as you have had episodes of GI bleeding that required hospitalization to manage with blood transfusions at an earlier office visit together.    2. Continue atorvastatin 10 mg a day. This medication is reducing your cholesterol numbers by blocking your liver from making too much cholesterol and can help remove cholesterol plaque from the heart artery walls. Your cholesterol levels were at goal on January 2024 blood work with a low LDL (bad) cholesterol level of 73 near your long term goal of LDL < 70.     3. Diabetes risk for heart disease can be reduced using a class of medication called SGLT2 inhibitors (they make the kidneys lose extra blood sugar and this protects the heart). Continue to take Jardiance 25 mg a day.     4. Continue amlodipine 10 mg a day for better blood pressure control. Your blood pressure is at goal range 120-130 mm Hg.    5. Moderate intensity exercise at least 3 times a week for 30-45 minutes a time. Exercise helps protect the heart and blood vessels.    6. Eat a heart healthy diet. Limit portions of red meat. Eat fresh fruits and vegetables instead of processed carbohydrates. Olive oil (1 tablespoon a day) as a source of omega-3 fat. The Mediterranean diet has been  shown in clinical trials to reduce risk of heart disease by following these principles.     For your ankle swelling (edema) continue to take Lasix (furosemide) 20 mg tablets as needed. You can take 1 in the morning for 2-3 pound weight gain or ankle swelling. This water pill starts to work within 30 minutes of taking the tablet.    Follow up with Dr. Hilton in 6 months.

## 2024-06-10 ENCOUNTER — APPOINTMENT (OUTPATIENT)
Dept: ENDOCRINOLOGY | Facility: CLINIC | Age: 84
End: 2024-06-10
Payer: MEDICARE

## 2024-06-10 ENCOUNTER — HOSPITAL ENCOUNTER (OUTPATIENT)
Dept: RADIOLOGY | Facility: CLINIC | Age: 84
Discharge: HOME | End: 2024-06-10
Payer: MEDICARE

## 2024-06-10 ENCOUNTER — PHARMACY VISIT (OUTPATIENT)
Dept: PHARMACY | Facility: CLINIC | Age: 84
End: 2024-06-10
Payer: COMMERCIAL

## 2024-06-10 DIAGNOSIS — M81.0 OSTEOPOROSIS, UNSPECIFIED OSTEOPOROSIS TYPE, UNSPECIFIED PATHOLOGICAL FRACTURE PRESENCE: ICD-10-CM

## 2024-06-10 PROCEDURE — RXMED WILLOW AMBULATORY MEDICATION CHARGE

## 2024-06-10 PROCEDURE — 77080 DXA BONE DENSITY AXIAL: CPT

## 2024-06-10 PROCEDURE — 77080 DXA BONE DENSITY AXIAL: CPT | Performed by: RADIOLOGY

## 2024-06-10 NOTE — PROGRESS NOTES
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 . Initial diagnosis with diabetes was 4 years ago. The patient has a family history mother, 2 brothers, and 1 sister with diabetes.   Known complications include: None    Previously seeing PCP for diabetes care.   Last visit with me 12/2023  A1c ***% today. Previous A1c 7.5% in 12/2023   Since last visit, ***    Historical meds:   Metformin- intolerable, caused GI upset, rectal bleeding which lead to anemia     Current diabetes regimen is as follows:   Jardiance 25 mg daily   glipizide ER 2.5 mg once daily     Patient is using continuous glucose monitor -Car 3 (not wearing today)  The patient is currently checking the blood glucose as needed         Hypoglycemia frequency: 1%  Hypoglycemia awareness: yes     The patient comes into the office today unhappy with A1c.  She feels this is too high.        ROS  General: no fever, chills or acute changes in weight in the last 6 months  Skin: no rashes, pruritis or dry skin  Cardiac: denies chest pain, heart palpitations or orthopnea  Pulmonary: denies wheezing, productive cough or exertional dyspnea      Objective    Physical Exam  There were no vitals taken for this visit.  General: not in acute distress, cooperative   Respiratory: normal respiratory effort  Musculoskeletal: normal gait - walks with cane      Current Outpatient Medications:     acetaminophen (Tylenol) 500 mg tablet, Take 2 tablets (1,000 mg) by mouth 2 times a day as needed. Take 2 tablets by mouth twice daily as needed for Pain. (typically takes once daily), Disp: , Rfl:     albuterol 2.5 mg /3 mL (0.083 %) nebulizer solution, Take 3 mL (2.5 mg) by nebulization 4 times a day as needed for wheezing or shortness of breath. Inhale by nebulizer over 5-15 minutes, Disp: , Rfl:     albuterol 90 mcg/actuation inhaler, USE 2 INHALATIONS BY MOUTH EVERY 4 TO 6 HOURS AS NEEDED, Disp: 51 g, Rfl: 2    " amLODIPine (Norvasc) 10 mg tablet, TAKE 1 TABLET BY MOUTH DAILY, Disp: 100 tablet, Rfl: 2    atorvastatin (Lipitor) 10 mg tablet, TAKE 1 TABLET BY MOUTH DAILY, Disp: 100 tablet, Rfl: 2    azaTHIOprine (Imuran) 50 mg tablet, TAKE 1 TABLET BY MOUTH DAILY AS  DIRECTED FOR A TOTAL OF 50MG  DAILY, Disp: 100 tablet, Rfl: 2    azelastine (Astelin) 137 mcg (0.1 %) nasal spray, Administer 2 sprays into each nostril 2 times a day., Disp: , Rfl:     blood-glucose sensor (FreeStyle Car 3 Sensor) device, CHANGE SENSOR EVERY 14 DAYS AS DIRECTED, Disp: 2 each, Rfl: 11    Breo Ellipta 100-25 mcg/dose inhaler, USE 1 INHALATION BY MOUTH ONCE  DAILY, Disp: 180 each, Rfl: 3    brimonidine (AlphaGAN P) 0.2 % ophthalmic solution, Administer 1 drop into affected eye(s) 3 times a day., Disp: , Rfl:     calcium carbonate-vitamin D3 (Oyster Shell) 250 mg-3.125 mcg (125 unit) tablet, Take 1 tablet by mouth 2 times a day., Disp: , Rfl:     cetirizine (ZyrTEC) 10 mg tablet, Take 1 tablet (10 mg) by mouth once daily., Disp: , Rfl:     chlorhexidine (Peridex) 0.12 % solution, RINSE WITH APPROX 1/2 OZ MORNING AND EVENING AFTER BRUSHING. RINSE AND SPIT. DO NOT SWALLOW, Disp: , Rfl:     cholecalciferol (Vitamin D-3) 10 MCG (400 UNIT) tablet, 3 tablets (30 mcg)., Disp: , Rfl:     cholecalciferol (Vitamin D-3) 50 mcg (2,000 unit) capsule, Take 1 capsule (50 mcg) by mouth early in the morning.., Disp: , Rfl:     clobetasol (Temovate) 0.05 % ointment, Apply 1 Application topically 2 times a day. APPLY AND GENTLY MASSAGE INTO AFFECTED AREA(S) TWICE DAILY., Disp: , Rfl:     cyanocobalamin (Vitamin B-12) 1,000 mcg tablet, Take 1 tablet (1,000 mcg) by mouth once daily., Disp: , Rfl:     dextromethorphan-guaifenesin (Mucinex DM)  mg 12 hr tablet, Take 1 tablet by mouth 2 times a day., Disp: , Rfl:     dorzolamide-timoloL (Cosopt) 22.3-6.8 mg/mL ophthalmic solution, Administer 1 drop into affected eye(s) 2 times a day., Disp: , Rfl:      dorzolamide-timolol, PF, (Cosopt) 2-0.5 % ophthalmic solution, , Disp: , Rfl:     ergocalciferol (Vitamin D-2) 1.25 MG (10730 UT) capsule, Take 1 capsule (50,000 Units) by mouth once a week., Disp: , Rfl:     fluticasone (Flonase) 50 mcg/actuation nasal spray, Administer 2 sprays into each nostril if needed for allergies., Disp: , Rfl:     furosemide (Lasix) 20 mg tablet, Take 1 tablet (20 mg) by mouth once daily. Take as needed for ankle swelling or 2-3 pound weight gain overnight., Disp: 30 tablet, Rfl: 11    glipiZIDE XL (Glucotrol XL) 2.5 mg 24 hr tablet, Take 1 tablet (2.5 mg) by mouth once daily., Disp: 90 tablet, Rfl: 3    hydrocortisone 2.5 % cream, Apply 1 Application topically if needed for irritation. TO ARM/LEG, Disp: , Rfl:     inhalational spacing device inhaler, Use as directed, Disp: , Rfl:     Jardiance 25 mg, Take 1 tablet (25 mg) by mouth once daily., Disp: 90 tablet, Rfl: 3    ketoconazole (NIZOral) 2 % cream, Apply 1 Application topically 2 times a day. Apply one application twice daily to face until dark spots improve, Disp: , Rfl:     lactase (Lactaid) 3,000 unit tablet, Take 1 tablet (3,000 Units) by mouth if needed. Take one tablet by mouth (with first bite) as needed for dairy meals., Disp: , Rfl:     latanoprost (Xalatan) 0.005 % ophthalmic solution, Administer 1 drop into the left eye once daily at bedtime., Disp: , Rfl:     loratadine (Claritin) 10 mg tablet, Take 1 tablet (10 mg) by mouth once daily., Disp: , Rfl:     loratadine-pseudoephedrine (Claritin-D 24 Hour)  mg 24 hr tablet, Take 1 tablet by mouth once daily as needed for allergies., Disp: , Rfl:     mesalamine (Asacol HD) 800 mg EC tablet, Take 1 tablet (800 mg) by mouth 4 times a day., Disp: , Rfl:     mesalamine (Lialda) 1.2 gram EC tablet, Take 4 tablets (4.8 g) by mouth once daily. Take with Food. Do not cut tablet. If symptoms worsen, may take 2 tablets twice daily, Disp: , Rfl:     neomycin-polymyxin B-dexameth  (Polydex) 3.5 mg/g-10,000 unit/g-0.1 % ointment ophthalmic ointment, Apply 1 Application to affected eye(s) 4 times a day., Disp: , Rfl:     netarsudiL (Rhopressa) 0.02 % drops opthalmic solution, 1 drop., Disp: , Rfl:     pantoprazole (ProtoNix) 40 mg EC tablet, TAKE 1 TABLET BY MOUTH ONCE  DAILY IN THE MORNING BEFORE A  MEAL. DO NOT CRUSH, CHEW, OR  SPLIT, Disp: 100 tablet, Rfl: 2    prednisoLONE acetate (Pred-Forte) 1 % ophthalmic suspension, USE 1 DROP IN THE RIGHT EYE FOUR TIMES DAILY., Disp: , Rfl:     predniSONE (Deltasone) 1 mg tablet, Take 4 tablets (4 mg) by mouth once daily in the morning., Disp: 360 tablet, Rfl: 3    predniSONE (Deltasone) 5 mg tablet, Take 1 tablet (5 mg) by mouth once daily., Disp: , Rfl:     triamcinolone (Kenalog) 0.1 % cream, Twice a day to affected area. Avoid face, groin, and armpits., Disp: 80 g, Rfl: 11    Assessment/Plan   Type 2 diabetes with hyperglycemia:   CAD:  ***    ***  - A1c at high end of normal for her age and comorbidities today.  She has not been monitoring but when she was her TIR is was 80%.  Recommended putting Car back on.  Discussed dove soap and using skin tac or overlay patch for better adhesion.  She will try these.  She is without low blood sugars at this time.      Plan:   Continue Jardiance 25 mg once daily   Continue glipizide ER 2.5 mg once daily   Continue Car 3    Try the adhesive to see if they will stay on.    Try to avoid Dove soap to the arm you are placing the sensor on    Follow up 6 months

## 2024-06-24 PROCEDURE — RXMED WILLOW AMBULATORY MEDICATION CHARGE

## 2024-06-25 ENCOUNTER — PHARMACY VISIT (OUTPATIENT)
Dept: PHARMACY | Facility: CLINIC | Age: 84
End: 2024-06-25
Payer: COMMERCIAL

## 2024-06-26 ENCOUNTER — APPOINTMENT (OUTPATIENT)
Dept: DERMATOLOGY | Facility: CLINIC | Age: 84
End: 2024-06-26
Payer: MEDICARE

## 2024-07-01 ENCOUNTER — APPOINTMENT (OUTPATIENT)
Dept: DERMATOLOGY | Facility: CLINIC | Age: 84
End: 2024-07-01
Payer: MEDICARE

## 2024-07-10 ENCOUNTER — APPOINTMENT (OUTPATIENT)
Dept: DERMATOLOGY | Facility: CLINIC | Age: 84
End: 2024-07-10
Payer: MEDICARE

## 2024-07-10 DIAGNOSIS — Z79.899 OTHER LONG TERM (CURRENT) DRUG THERAPY: ICD-10-CM

## 2024-07-10 DIAGNOSIS — L12.0 BULLOUS PEMPHIGOID (MULTI): Primary | ICD-10-CM

## 2024-07-10 PROCEDURE — 99214 OFFICE O/P EST MOD 30 MIN: CPT | Performed by: DERMATOLOGY

## 2024-07-10 PROCEDURE — G2211 COMPLEX E/M VISIT ADD ON: HCPCS | Performed by: DERMATOLOGY

## 2024-07-10 RX ORDER — AZATHIOPRINE 50 MG/1
75 TABLET ORAL DAILY
Qty: 90 TABLET | Refills: 5 | Status: SHIPPED | OUTPATIENT
Start: 2024-07-10 | End: 2024-08-09

## 2024-07-10 ASSESSMENT — DERMATOLOGY QUALITY OF LIFE (QOL) ASSESSMENT
RATE HOW BOTHERED YOU ARE BY EFFECTS OF YOUR SKIN PROBLEMS ON YOUR ACTIVITIES (EG, GOING OUT, ACCOMPLISHING WHAT YOU WANT, WORK ACTIVITIES OR YOUR RELATIONSHIPS WITH OTHERS): 1
DATE THE QUALITY-OF-LIFE ASSESSMENT WAS COMPLETED: 67031
WHAT SINGLE SKIN CONDITION LISTED BELOW IS THE PATIENT ANSWERING THE QUALITY-OF-LIFE ASSESSMENT QUESTIONS ABOUT: DERMATITIS
RATE HOW BOTHERED YOU ARE BY SYMPTOMS OF YOUR SKIN PROBLEM (EG, ITCHING, STINGING BURNING, HURTING OR SKIN IRRITATION): 4
RATE HOW BOTHERED YOU ARE BY SYMPTOMS OF YOUR SKIN PROBLEM (EG, ITCHING, STINGING BURNING, HURTING OR SKIN IRRITATION): 4
WHAT SINGLE SKIN CONDITION LISTED BELOW IS THE PATIENT ANSWERING THE QUALITY-OF-LIFE ASSESSMENT QUESTIONS ABOUT: DERMATITIS
RATE HOW BOTHERED YOU ARE BY EFFECTS OF YOUR SKIN PROBLEMS ON YOUR ACTIVITIES (EG, GOING OUT, ACCOMPLISHING WHAT YOU WANT, WORK ACTIVITIES OR YOUR RELATIONSHIPS WITH OTHERS): 1
RATE HOW EMOTIONALLY BOTHERED YOU ARE BY YOUR SKIN PROBLEM (FOR EXAMPLE, WORRY, EMBARRASSMENT, FRUSTRATION): 1
RATE HOW EMOTIONALLY BOTHERED YOU ARE BY YOUR SKIN PROBLEM (FOR EXAMPLE, WORRY, EMBARRASSMENT, FRUSTRATION): 1

## 2024-07-10 ASSESSMENT — PATIENT GLOBAL ASSESSMENT (PGA): WHAT IS THE PGA: PATIENT GLOBAL ASSESSMENT:  1 - CLEAR

## 2024-07-10 NOTE — PROGRESS NOTES
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     Last seen 4/2024 for bullous pemphigoid, currently on prednisone 4mg daily, calcium/vitamin D, Azathioprine 50mg daily, TAC 0.1% ointment and hydrocortisone 2.5% ointment.     She only had 1 recent flare which resolved quickly with hydrocortisone. She would like refills of her topicals.     Patient states that she had blisters a few weeks ago that she used topicals for that resolved. The lesions lasted for 1-2 weeks. No lesions to show today. Only using topicals when flaring. Has had 3 breakouts since April.    Review of Systems:  No other skin or systemic complaints other than what is documented elsewhere in the note.    The following portions of the chart were reviewed this encounter and updated as appropriate:       Skin Cancer History  No skin cancer on file.    Specialty Problems          Dermatology Problems    Bullous pemphigoid (Multi)    Rash and other nonspecific skin eruption    Folliculitis    Pemphigoid (Multi)    Psoriasis    SCC (squamous cell carcinoma)     SCC SERIES         Urticaria    Dermatitis, eczematoid     Past Medical History:  Rev. 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    Family History:  Patient family history includes CARDIAC DISORDER in an other family member; Diabetes in an other family member; Multiple sclerosis in her sibling.    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 She has never used smokeless tobacco. She reports that she does not drink alcohol and does not use drugs.    Allergies:  Anastrozole, Exemestane, Hydralazine,  Morphine, Penicillins, Xanthines, Lactose, Lisinopril, Sulfamethoxazole-trimethoprim, Sulfasalazine, Tamoxifen, Mesalamine, and Sulfa (sulfonamide antibiotics)    Current Medications / CAM's:    Current Outpatient Medications:     acetaminophen (Tylenol) 500 mg tablet, Take 2 tablets (1,000 mg) by mouth 2 times a day as needed. Take 2 tablets by mouth twice daily as needed for Pain. (typically takes once daily), Disp: , Rfl:     albuterol 2.5 mg /3 mL (0.083 %) nebulizer solution, Take 3 mL (2.5 mg) by nebulization 4 times a day as needed for wheezing or shortness of breath. Inhale by nebulizer over 5-15 minutes, Disp: , Rfl:     albuterol 90 mcg/actuation inhaler, USE 2 INHALATIONS BY MOUTH EVERY 4 TO 6 HOURS AS NEEDED, Disp: 51 g, Rfl: 2    amLODIPine (Norvasc) 10 mg tablet, Take 1 tablet (10 mg) by mouth once daily., Disp: 100 tablet, Rfl: 2    atorvastatin (Lipitor) 10 mg tablet, Take 1 tablet (10 mg) by mouth once daily., Disp: 100 tablet, Rfl: 2    azaTHIOprine (Imuran) 50 mg tablet, Take 1.5 tablets (75 mg) by mouth once daily. Take 1.5 tablets (75mg) by mouth once daily, Disp: 90 tablet, Rfl: 5    azelastine (Astelin) 137 mcg (0.1 %) nasal spray, Administer 2 sprays into each nostril 2 times a day., Disp: , Rfl:     azelastine (Astelin) 137 mcg (0.1 %) nasal spray, Administer 2 sprays into each nostril 2 times a day. Use in each nostril as directed, Disp: 30 mL, Rfl: 12    blood-glucose sensor (FreeStyle Car 3 Sensor) device, CHANGE SENSOR EVERY 14 DAYS AS DIRECTED, Disp: 2 each, Rfl: 11    Breo Ellipta 100-25 mcg/dose inhaler, USE 1 INHALATION BY MOUTH ONCE  DAILY, Disp: 180 each, Rfl: 3    brimonidine (AlphaGAN P) 0.2 % ophthalmic solution, Administer 1 drop into affected eye(s) 3 times a day., Disp: , Rfl:     calcium carbonate-vitamin D3 (Oyster Shell) 250 mg-3.125 mcg (125 unit) tablet, Take 1 tablet by mouth 2 times a day., Disp: , Rfl:     chlorhexidine (Peridex) 0.12 % solution, RINSE WITH APPROX  1/2 OZ MORNING AND EVENING AFTER BRUSHING. RINSE AND SPIT. DO NOT SWALLOW, Disp: , Rfl:     cholecalciferol (Vitamin D-3) 10 MCG (400 UNIT) tablet, 3 tablets (30 mcg)., Disp: , Rfl:     cholecalciferol (Vitamin D-3) 50 mcg (2,000 unit) capsule, Take 1 capsule (50 mcg) by mouth early in the morning.., Disp: , Rfl:     clobetasol (Temovate) 0.05 % ointment, Apply 1 Application topically 2 times a day. APPLY AND GENTLY MASSAGE INTO AFFECTED AREA(S) TWICE DAILY., Disp: , Rfl:     cyanocobalamin (Vitamin B-12) 1,000 mcg tablet, Take 1 tablet (1,000 mcg) by mouth once daily., Disp: , Rfl:     dextromethorphan-guaifenesin (Mucinex DM)  mg 12 hr tablet, Take 1 tablet by mouth 2 times a day., Disp: , Rfl:     dorzolamide-timoloL (Cosopt) 22.3-6.8 mg/mL ophthalmic solution, Administer 1 drop into affected eye(s) 2 times a day., Disp: , Rfl:     dorzolamide-timolol, PF, (Cosopt) 2-0.5 % ophthalmic solution, , Disp: , Rfl:     ergocalciferol (Vitamin D-2) 1.25 MG (10988 UT) capsule, Take 1 capsule (50,000 Units) by mouth once a week., Disp: , Rfl:     fluticasone (Flonase) 50 mcg/actuation nasal spray, Administer 2 sprays into each nostril if needed for allergies., Disp: 16 g, Rfl: 3    furosemide (Lasix) 20 mg tablet, Take 1 tablet (20 mg) by mouth once daily. Take as needed for ankle swelling or 2-3 pound weight gain overnight., Disp: 30 tablet, Rfl: 11    glipiZIDE XL (Glucotrol XL) 2.5 mg 24 hr tablet, Take 1 tablet (2.5 mg) by mouth once daily., Disp: 90 tablet, Rfl: 3    hydrocortisone 2.5 % cream, Apply 1 Application topically if needed for irritation. TO ARM/LEG, Disp: , Rfl:     inhalational spacing device inhaler, Use as directed, Disp: , Rfl:     Jardiance 25 mg, Take 1 tablet (25 mg) by mouth once daily., Disp: 90 tablet, Rfl: 3    ketoconazole (NIZOral) 2 % cream, Apply 1 Application topically 2 times a day. Apply one application twice daily to face until dark spots improve, Disp: , Rfl:     lactase (Lactaid)  3,000 unit tablet, Take 1 tablet (3,000 Units) by mouth if needed. Take one tablet by mouth (with first bite) as needed for dairy meals., Disp: , Rfl:     latanoprost (Xalatan) 0.005 % ophthalmic solution, Administer 1 drop into the left eye once daily at bedtime., Disp: , Rfl:     mesalamine (Asacol HD) 800 mg EC tablet, Take 1 tablet (800 mg) by mouth 4 times a day., Disp: , Rfl:     neomycin-polymyxin B-dexameth (Polydex) 3.5 mg/g-10,000 unit/g-0.1 % ointment ophthalmic ointment, Apply 1 Application to affected eye(s) 4 times a day., Disp: , Rfl:     netarsudiL (Rhopressa) 0.02 % drops opthalmic solution, 1 drop., Disp: , Rfl:     pantoprazole (ProtoNix) 40 mg EC tablet, TAKE 1 TABLET BY MOUTH ONCE  DAILY IN THE MORNING BEFORE A  MEAL. DO NOT CRUSH, CHEW, OR  SPLIT, Disp: 100 tablet, Rfl: 2    prednisoLONE acetate (Pred-Forte) 1 % ophthalmic suspension, USE 1 DROP IN THE RIGHT EYE FOUR TIMES DAILY., Disp: , Rfl:     predniSONE (Deltasone) 1 mg tablet, Take 4 tablets (4 mg) by mouth once daily in the morning., Disp: 360 tablet, Rfl: 3    triamcinolone (Kenalog) 0.1 % cream, Twice a day to affected area. Avoid face, groin, and armpits., Disp: 80 g, Rfl: 11     Objective   Well appearing patient in no apparent distress; mood and affect are within normal limits.    A full examination was performed including scalp, head, eyes, ears, nose, lips, neck, chest, axillae, abdomen, back, buttocks, bilateral upper extremities, bilateral lower extremities, hands, feet, fingers, toes, fingernails, and toenails. All findings within normal limits unless otherwise noted below.    Assessment/Plan   1. Bullous pemphigoid (Multi)  No active lesions     Bullous pemphigoid - stable  - Flares quickly responding to topical corticosteroids.  - Continue prednisone 4 mg daily. Refills provided today. Continue Calcium and Vitamin D supplementation.   - Increase Azathioprine from 50mg to 75mg PO daily  - Continue triamcinolone 0.1% ointment  BID as needed for flares and hydrocortisone 2.5% ointment to use on flares on the face  - Recent CBC on 7/2024 unremarkable. She is being monitored by hematology. CMP on 3/11/2024 were stable.   - RTC 3 months    Related Procedures  Follow Up In Dermatology - Established Patient  Comprehensive metabolic panel  CBC and Auto Differential  Follow Up In Dermatology - Established Patient    Related Medications  triamcinolone (Kenalog) 0.1 % cream  Twice a day to affected area. Avoid face, groin, and armpits.    predniSONE (Deltasone) 1 mg tablet  Take 4 tablets (4 mg) by mouth once daily in the morning.    azaTHIOprine (Imuran) 50 mg tablet  Take 1.5 tablets (75 mg) by mouth once daily. Take 1.5 tablets (75mg) by mouth once daily    2. Other long term (current) drug therapy  No active lesions    Refer to plan above    Related Procedures  Follow Up In Dermatology - Established Patient  Comprehensive metabolic panel  CBC and Auto Differential  Follow Up In Dermatology - Established Patient    Related Medications  predniSONE (Deltasone) 1 mg tablet  Take 4 tablets (4 mg) by mouth once daily in the morning.    azaTHIOprine (Imuran) 50 mg tablet  Take 1.5 tablets (75 mg) by mouth once daily. Take 1.5 tablets (75mg) by mouth once daily      Dez Mesa,   PGY-4 Dermatology    I saw and evaluated the patient. I personally obtained the key and critical portions of the history and physical exam or was physically present for key and critical portions performed by the resident/fellow. I reviewed the resident/fellow's documentation and discussed the patient with the resident/fellow. I agree with the resident/fellow's medical decision making as documented in the note.    Negrito Young MD PhD

## 2024-07-15 ENCOUNTER — LAB (OUTPATIENT)
Dept: LAB | Facility: LAB | Age: 84
End: 2024-07-15
Payer: MEDICARE

## 2024-07-15 ENCOUNTER — APPOINTMENT (OUTPATIENT)
Dept: PRIMARY CARE | Facility: CLINIC | Age: 84
End: 2024-07-15
Payer: MEDICARE

## 2024-07-15 VITALS
BODY MASS INDEX: 30.3 KG/M2 | SYSTOLIC BLOOD PRESSURE: 136 MMHG | DIASTOLIC BLOOD PRESSURE: 72 MMHG | OXYGEN SATURATION: 95 % | HEART RATE: 80 BPM | HEIGHT: 63 IN | WEIGHT: 171 LBS

## 2024-07-15 DIAGNOSIS — R42 DIZZINESS: ICD-10-CM

## 2024-07-15 DIAGNOSIS — E78.9 LIPID DISORDER: ICD-10-CM

## 2024-07-15 DIAGNOSIS — E11.9 TYPE 2 DIABETES MELLITUS WITHOUT COMPLICATION, WITHOUT LONG-TERM CURRENT USE OF INSULIN (MULTI): Primary | ICD-10-CM

## 2024-07-15 DIAGNOSIS — I10 HYPERTENSION, UNSPECIFIED TYPE: ICD-10-CM

## 2024-07-15 DIAGNOSIS — Z79.899 OTHER LONG TERM (CURRENT) DRUG THERAPY: ICD-10-CM

## 2024-07-15 DIAGNOSIS — E11.9 TYPE 2 DIABETES MELLITUS WITHOUT COMPLICATION, WITHOUT LONG-TERM CURRENT USE OF INSULIN (MULTI): ICD-10-CM

## 2024-07-15 DIAGNOSIS — H91.90 HEARING LOSS, UNSPECIFIED HEARING LOSS TYPE, UNSPECIFIED LATERALITY: ICD-10-CM

## 2024-07-15 DIAGNOSIS — L12.0 BULLOUS PEMPHIGOID (MULTI): ICD-10-CM

## 2024-07-15 LAB
ALBUMIN SERPL BCP-MCNC: 4.2 G/DL (ref 3.4–5)
ALP SERPL-CCNC: 88 U/L (ref 33–136)
ALT SERPL W P-5'-P-CCNC: 9 U/L (ref 7–45)
ANION GAP SERPL CALC-SCNC: 14 MMOL/L (ref 10–20)
AST SERPL W P-5'-P-CCNC: 11 U/L (ref 9–39)
BASOPHILS # BLD AUTO: 0.04 X10*3/UL (ref 0–0.1)
BASOPHILS NFR BLD AUTO: 0.6 %
BILIRUB SERPL-MCNC: 0.2 MG/DL (ref 0–1.2)
BUN SERPL-MCNC: 9 MG/DL (ref 6–23)
CALCIUM SERPL-MCNC: 9.3 MG/DL (ref 8.6–10.6)
CHLORIDE SERPL-SCNC: 108 MMOL/L (ref 98–107)
CHOLEST SERPL-MCNC: 150 MG/DL (ref 0–199)
CHOLESTEROL/HDL RATIO: 4.2
CO2 SERPL-SCNC: 22 MMOL/L (ref 21–32)
CREAT SERPL-MCNC: 0.81 MG/DL (ref 0.5–1.05)
EGFRCR SERPLBLD CKD-EPI 2021: 72 ML/MIN/1.73M*2
EOSINOPHIL # BLD AUTO: 0.16 X10*3/UL (ref 0–0.4)
EOSINOPHIL NFR BLD AUTO: 2.4 %
ERYTHROCYTE [DISTWIDTH] IN BLOOD BY AUTOMATED COUNT: 17.2 % (ref 11.5–14.5)
EST. AVERAGE GLUCOSE BLD GHB EST-MCNC: 166 MG/DL
GLUCOSE SERPL-MCNC: 125 MG/DL (ref 74–99)
HBA1C MFR BLD: 7.4 %
HCT VFR BLD AUTO: 40.8 % (ref 36–46)
HDLC SERPL-MCNC: 35.6 MG/DL
HGB BLD-MCNC: 11.8 G/DL (ref 12–16)
IMM GRANULOCYTES # BLD AUTO: 0.01 X10*3/UL (ref 0–0.5)
IMM GRANULOCYTES NFR BLD AUTO: 0.1 % (ref 0–0.9)
LDLC SERPL CALC-MCNC: 86 MG/DL
LYMPHOCYTES # BLD AUTO: 0.87 X10*3/UL (ref 0.8–3)
LYMPHOCYTES NFR BLD AUTO: 12.9 %
MCH RBC QN AUTO: 23.8 PG (ref 26–34)
MCHC RBC AUTO-ENTMCNC: 28.9 G/DL (ref 32–36)
MCV RBC AUTO: 82 FL (ref 80–100)
MONOCYTES # BLD AUTO: 0.31 X10*3/UL (ref 0.05–0.8)
MONOCYTES NFR BLD AUTO: 4.6 %
NEUTROPHILS # BLD AUTO: 5.33 X10*3/UL (ref 1.6–5.5)
NEUTROPHILS NFR BLD AUTO: 79.4 %
NON HDL CHOLESTEROL: 114 MG/DL (ref 0–149)
NRBC BLD-RTO: 0 /100 WBCS (ref 0–0)
PLATELET # BLD AUTO: 340 X10*3/UL (ref 150–450)
POTASSIUM SERPL-SCNC: 3.9 MMOL/L (ref 3.5–5.3)
PROT SERPL-MCNC: 7.5 G/DL (ref 6.4–8.2)
RBC # BLD AUTO: 4.95 X10*6/UL (ref 4–5.2)
SODIUM SERPL-SCNC: 140 MMOL/L (ref 136–145)
TRIGL SERPL-MCNC: 142 MG/DL (ref 0–149)
VLDL: 28 MG/DL (ref 0–40)
WBC # BLD AUTO: 6.7 X10*3/UL (ref 4.4–11.3)

## 2024-07-15 PROCEDURE — 99214 OFFICE O/P EST MOD 30 MIN: CPT | Performed by: FAMILY MEDICINE

## 2024-07-15 PROCEDURE — 1036F TOBACCO NON-USER: CPT | Performed by: FAMILY MEDICINE

## 2024-07-15 PROCEDURE — 1160F RVW MEDS BY RX/DR IN RCRD: CPT | Performed by: FAMILY MEDICINE

## 2024-07-15 PROCEDURE — 3075F SYST BP GE 130 - 139MM HG: CPT | Performed by: FAMILY MEDICINE

## 2024-07-15 PROCEDURE — 83036 HEMOGLOBIN GLYCOSYLATED A1C: CPT

## 2024-07-15 PROCEDURE — 80053 COMPREHEN METABOLIC PANEL: CPT

## 2024-07-15 PROCEDURE — 1159F MED LIST DOCD IN RCRD: CPT | Performed by: FAMILY MEDICINE

## 2024-07-15 PROCEDURE — 3078F DIAST BP <80 MM HG: CPT | Performed by: FAMILY MEDICINE

## 2024-07-15 PROCEDURE — 80061 LIPID PANEL: CPT

## 2024-07-15 PROCEDURE — 36415 COLL VENOUS BLD VENIPUNCTURE: CPT

## 2024-07-15 PROCEDURE — 85025 COMPLETE CBC W/AUTO DIFF WBC: CPT

## 2024-07-15 RX ORDER — FLUTICASONE PROPIONATE 50 MCG
2 SPRAY, SUSPENSION (ML) NASAL AS NEEDED
Qty: 16 G | Refills: 3 | Status: SHIPPED | OUTPATIENT
Start: 2024-07-15

## 2024-07-15 RX ORDER — ATORVASTATIN CALCIUM 10 MG/1
10 TABLET, FILM COATED ORAL DAILY
Qty: 100 TABLET | Refills: 2 | Status: SHIPPED | OUTPATIENT
Start: 2024-07-15

## 2024-07-15 RX ORDER — AZELASTINE 1 MG/ML
2 SPRAY, METERED NASAL 2 TIMES DAILY
Qty: 30 ML | Refills: 12 | Status: SHIPPED | OUTPATIENT
Start: 2024-07-15 | End: 2025-07-15

## 2024-07-15 RX ORDER — AMLODIPINE BESYLATE 10 MG/1
10 TABLET ORAL DAILY
Qty: 100 TABLET | Refills: 2 | Status: SHIPPED | OUTPATIENT
Start: 2024-07-15

## 2024-07-15 ASSESSMENT — PATIENT HEALTH QUESTIONNAIRE - PHQ9
1. LITTLE INTEREST OR PLEASURE IN DOING THINGS: NOT AT ALL
SUM OF ALL RESPONSES TO PHQ9 QUESTIONS 1 AND 2: 0
2. FEELING DOWN, DEPRESSED OR HOPELESS: NOT AT ALL

## 2024-07-15 ASSESSMENT — ENCOUNTER SYMPTOMS
DEPRESSION: 0
OCCASIONAL FEELINGS OF UNSTEADINESS: 0
LOSS OF SENSATION IN FEET: 0

## 2024-07-15 ASSESSMENT — COLUMBIA-SUICIDE SEVERITY RATING SCALE - C-SSRS
1. IN THE PAST MONTH, HAVE YOU WISHED YOU WERE DEAD OR WISHED YOU COULD GO TO SLEEP AND NOT WAKE UP?: NO
6. HAVE YOU EVER DONE ANYTHING, STARTED TO DO ANYTHING, OR PREPARED TO DO ANYTHING TO END YOUR LIFE?: NO
2. HAVE YOU ACTUALLY HAD ANY THOUGHTS OF KILLING YOURSELF?: NO

## 2024-07-15 NOTE — PROGRESS NOTES
Patient is here for her follow-up appointment    She was concerned with her dizziness symptom    I reviewed her chart and it is not due to iron deficiency    Patient does believe it is due to sinus congestion therefore Azelastine Nasal spray and Flonase was encouraged to be used daily    Diabetes is under control on glipizide and Jardiance    For Crohn's she is under the care of gastroenterology    And for dermatological disorder she is under the care of of [facility] neurologist    She was complaining of hearing loss for which I referred her to audiology    Advised her to come back for her annual physical to make that appointment before she leaves    Also I will check a lipid panel today since that has not been checked for some time along with A1c    She says freestyle scans A1c and it was 6.9 and we will check whether it corresponds to the lab values

## 2024-07-15 NOTE — PATIENT INSTRUCTIONS
The dizziness is coming from your sinus pressure.  I would like you to take Azelastine Nasal spray the nose spray and the Flonase nose spray every single day 2 sprays to the side of the nose while looking down morning and evening I sent both prescriptions to the pharmacy [facility] home delivery    Also I renewed your amlodipine and atorvastatin prescriptions    I have referred you to audiology to check on your hearing    Your next appointment with me for your Medicare wellness visit should be in January      I placed the order for you to get the lab draw at any of the  labs a lipid panel and an A1c    You informed me on your scan the A1c was 6.9 lets see whether it is corresponds to the lab draw

## 2024-07-23 PROBLEM — L10.0 PEMPHIGUS VULGARIS (MULTI): Status: RESOLVED | Noted: 2020-03-02 | Resolved: 2024-07-23

## 2024-08-09 PROCEDURE — RXMED WILLOW AMBULATORY MEDICATION CHARGE

## 2024-08-14 ENCOUNTER — PHARMACY VISIT (OUTPATIENT)
Dept: PHARMACY | Facility: CLINIC | Age: 84
End: 2024-08-14
Payer: COMMERCIAL

## 2024-08-14 ENCOUNTER — CLINICAL SUPPORT (OUTPATIENT)
Dept: AUDIOLOGY | Facility: CLINIC | Age: 84
End: 2024-08-14
Payer: MEDICARE

## 2024-08-14 DIAGNOSIS — H91.90 HEARING LOSS, UNSPECIFIED HEARING LOSS TYPE, UNSPECIFIED LATERALITY: ICD-10-CM

## 2024-08-14 NOTE — PROGRESS NOTES
Name: Rand Chilel  YOB: 1940  Age: 83 y.o.    Date of Evaluation:  8/14/2024      History:      Patient presents today for hearing test in conjunction with ENT visit.  Patient reports bilateral hearing loss, especially noticeable in background noise.    Reports constant bilateral tinnitus. Denies vertigo.    Evaluation:    Otoscopy revealed occluded ear canals with significant amounts of cerumen visualized bilaterally.    Further testing was discontinued, as patient has almost entirely occluded ear canals bilaterally, which will likely affect hearing test results.      (Patient paid co-pay today, therefore when she returns for hearing test following ear cleaning, she should not pay co-pay at that time.)      Treatment Plan:    - Schedule with ENT for cerumen management   - Return for hearing test following cerumen management    [id number]    Yisel Chan, CCC-A

## 2024-08-15 NOTE — PROGRESS NOTES
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  Date of Evaluation:  2024    IMPRESSIONS     Today's test results are consistent with normal hearing sensitivity sloping to a moderately-severe SNHL, bilaterally. Discussed results and recommendations with patient.  Questions were addressed and the patient was encouraged to contact our department should concerns arise.    Hearing aids were discussed as a management option for hearing loss pending medical clearance. Patient chose to proceed with monitoring hearing at this time.    RECOMMENDATIONS     Continue medical follow up with PCP/ENT.  Return for evaluation following any medical management.     Time: 2080-8407    HISTORY     Rand Chilel is seen today following ENT appointment for cerumen management.  Patient reported gradual decline in hearing, bilaterally. Specifically difficulties in group settings. She also noted constant bilateral tinnitus described as a \"ring\" to her. Patient denied history of dizziness, aural fullness, or excessive noise exposure. No history of hearing aid use.    TEST RESULTS     Otoscopic Evaluation:  Physical exam to evaluate the outer ear  Right Ear: Clear ear canal.  Left Ear: Clear ear canal.    Tympanometry & Acoustic Reflexes:  Assesses the function of the middle ear and inner ear structures  Right Ear: Tympanometry revealed normal ear canal volume, peak pressure, and compliance, consistent with normal middle ear function (Type A). Ipsilateral Acoustic Reflexes were present 500-4000 Hz.   Left Ear: Tympanometry revealed normal ear canal volume, peak pressure, and compliance, consistent with normal middle ear function (Type A). Ipsilateral Acoustic Reflexes were present 500-4000 Hz.     Distortion Product Otoacoustic Emissions: Assesses the cochlear outer hair cell function (2147-5307 Hz frequency range)  Right Ear: DNT  Left Ear:  DNT    Pure Tone Audiometry: " Conventional Audiometry via TDH Headphones with good reliability  Right Ear: Hearing sensitivity WNL from 125-1000 Hz sloping to a moderately-severe SNHL.  Left Ear: Hearing sensitivity essentially WNL from 125-1000 Hz sloping to a moderately-severe SNHL.    Speech Audiometry:   Right Ear: Speech Reception Threshold (SRT) was obtained at 25 dBHL. Speech reception threshold in agreement with pure tone average. Word Recognition scores were excellent (100%) in quiet when words were presented at 65 dBHL.   Left Ear: Speech Reception Threshold (SRT) was obtained at 25 dBHL. Speech reception threshold in agreement with pure tone average. Word Recognition scores were excellent (100%) in quiet when words were presented at 65 dBHL.       Testing and interpretation of results completed Irish Gurrola CCC-A CCVR. It was my pleasure to evaluate this patient.       IRISH Gurrola, CCC-A CCVR  Senior Clinical Vestibular Audiologist    Degree of Hearing Sensitivity Decibel Range   Within Normal Limits (WNL) 0-25   Mild 26-40   Moderate 41-55   Moderately-Severe 56-70   Severe 71-90   Profound 91+      Key   CNT/DNT Could Not Test/Did Not Test   TM Tympanic Membrane   WNL Within Normal Limits   HA Hearing Aid   SNHL Sensorineural Hearing Loss   CHL Conductive Hearing Loss   NIHL Noise-Induced Hearing Loss   ECV Ear Canal Volume   RE/LE Right Ear/Left Ear        AUDIOGRAM

## 2024-08-16 ENCOUNTER — OFFICE VISIT (OUTPATIENT)
Dept: OTOLARYNGOLOGY | Facility: CLINIC | Age: 84
End: 2024-08-16
Payer: MEDICARE

## 2024-08-16 ENCOUNTER — CLINICAL SUPPORT (OUTPATIENT)
Dept: AUDIOLOGY | Facility: CLINIC | Age: 84
End: 2024-08-16
Payer: MEDICARE

## 2024-08-16 DIAGNOSIS — H91.93 HEARING DIFFICULTY OF BOTH EARS: ICD-10-CM

## 2024-08-16 DIAGNOSIS — H61.23 BILATERAL IMPACTED CERUMEN: ICD-10-CM

## 2024-08-16 DIAGNOSIS — H90.3 SENSORINEURAL HEARING LOSS (SNHL), BILATERAL: Primary | ICD-10-CM

## 2024-08-16 PROCEDURE — 92550 TYMPANOMETRY & REFLEX THRESH: CPT

## 2024-08-16 PROCEDURE — 1160F RVW MEDS BY RX/DR IN RCRD: CPT | Performed by: NURSE PRACTITIONER

## 2024-08-16 PROCEDURE — 1036F TOBACCO NON-USER: CPT | Performed by: NURSE PRACTITIONER

## 2024-08-16 PROCEDURE — 92557 COMPREHENSIVE HEARING TEST: CPT

## 2024-08-16 PROCEDURE — 99203 OFFICE O/P NEW LOW 30 MIN: CPT | Performed by: NURSE PRACTITIONER

## 2024-08-16 PROCEDURE — 1159F MED LIST DOCD IN RCRD: CPT | Performed by: NURSE PRACTITIONER

## 2024-08-16 ASSESSMENT — PATIENT HEALTH QUESTIONNAIRE - PHQ9
SUM OF ALL RESPONSES TO PHQ9 QUESTIONS 1 AND 2: 0
2. FEELING DOWN, DEPRESSED OR HOPELESS: NOT AT ALL
1. LITTLE INTEREST OR PLEASURE IN DOING THINGS: NOT AT ALL

## 2024-08-16 NOTE — PROGRESS NOTES
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  HPI  This patient is referred for evaluation of a sensation of clogged ear.  The patient is accompanied by  her granddaughters.   When asked about ear pain, hearing loss, itching, discharge from ear, tinnitus, aural fullness or autophony, the patient admits to bilateral hearing difficulty especially in the presence of background noise.  Patient was scheduled for an audiogram and found to have bilateral cerumen impactions.  She is scheduled for another audiogram today after ear cleaning.   The patient does not wear [] hearing aid.  When asked about a significant past otological history including history of prior ear surgery, noise exposure, exposure to ototoxic drugs or agents, and/or family history of hearing loss, the patient admits to none.    Review of Systems  A comprehensive or 10 points review of the patient's constitutional, neurological, HEENT, pulmonary, cardiovascular and genito-urinary systems showed only those mentioned in history of present illness.    Objective   Physical Exam  Constitutional: no fever, chills, weight loss or weight gain   General appearance: Appears well, well-nourished, well groomed. No acute distress.   Communication: Normal communication   Psychiatric: Oriented to person, place and time. Normal mood and affect.   Neurologic: Cranial nerves II-XII grossly intact and symmetric bilaterally.   Head and Face:   Head: Atraumatic with no masses, lesions or scarring.   Face: Normal symmetry, no paralysis, synkinesis or facial tic. No scars or deformities.     Eyes: Conjunctiva not edematous or erythematous   Ears: External inspection of ears with no deformity, scars or masses.  Bilateral canals with cerumen impactions     Neck: Normal appearing, symmetric, trachea midline.   Cardiovascular: Examination of peripheral vascular system shows no clubbing or cyanosis.   Respiratory: " "No respiratory distress increased work of breathing. Inspection of the chest with symmetric chest expansion and normal respiratory effort.   Skin: No rashes in the head or neck    Assessment/Plan     This patient presents for initial evaluation of acute acquired bilateral cerumen impaction and bilateral hearing difficulty.    Reassurance given that otologic exam is normal after cleaning.  Patient reports significant improvement in her hearing after cleaning.  I recommended using oil-based drops twice per month to prevent dryness and future impaction.  She may follow-up as needed.  If there are any concerning findings on her audiogram today, she will be referred back to my office.  All questions were answered to patient's satisfaction.    This note was created using speech recognition transcription software. Despite proofreading, several typographical errors might be present that might affect the meaning of the content. Please call with any questions.     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    Ear cerumen removal    Date/Time: 8/16/2024 11:31 AM    Performed by: ARPITA Sun  Authorized by: ARPITA Sun    Consent:     Consent obtained:  Verbal    Consent given by:  Patient    Risks discussed:  Pain    Alternatives discussed:  No treatment  Procedure details:     Location:  L ear and R ear    Procedure type comment:  Right angle hook and suction    Procedure outcomes: cerumen removed    Post-procedure details:     Inspection:  No bleeding and TM intact    Hearing quality:  Improved    Procedure completion:  Tolerated well, no immediate complications      ARPITA Sun 08/16/24 11:29 AM   "

## 2024-09-04 PROCEDURE — RXMED WILLOW AMBULATORY MEDICATION CHARGE

## 2024-09-09 ENCOUNTER — PHARMACY VISIT (OUTPATIENT)
Dept: PHARMACY | Facility: CLINIC | Age: 84
End: 2024-09-09
Payer: COMMERCIAL

## 2024-10-02 PROCEDURE — RXMED WILLOW AMBULATORY MEDICATION CHARGE

## 2024-10-03 DIAGNOSIS — E11.65 TYPE 2 DIABETES MELLITUS WITH HYPERGLYCEMIA, WITHOUT LONG-TERM CURRENT USE OF INSULIN: ICD-10-CM

## 2024-10-04 ENCOUNTER — PHARMACY VISIT (OUTPATIENT)
Dept: PHARMACY | Facility: CLINIC | Age: 84
End: 2024-10-04
Payer: COMMERCIAL

## 2024-10-04 RX ORDER — GLIPIZIDE 2.5 MG/1
2.5 TABLET, EXTENDED RELEASE ORAL DAILY
Qty: 100 TABLET | Refills: 0 | Status: SHIPPED | OUTPATIENT
Start: 2024-10-04

## 2024-10-15 ENCOUNTER — APPOINTMENT (OUTPATIENT)
Dept: DERMATOLOGY | Facility: CLINIC | Age: 84
End: 2024-10-15
Payer: MEDICARE

## 2024-10-15 ASSESSMENT — DERMATOLOGY QUALITY OF LIFE (QOL) ASSESSMENT
WHAT SINGLE SKIN CONDITION LISTED BELOW IS THE PATIENT ANSWERING THE QUALITY-OF-LIFE ASSESSMENT QUESTIONS ABOUT: NONE OF THE ABOVE
RATE HOW EMOTIONALLY BOTHERED YOU ARE BY YOUR SKIN PROBLEM (FOR EXAMPLE, WORRY, EMBARRASSMENT, FRUSTRATION): 0 - NEVER BOTHERED
WHAT SINGLE SKIN CONDITION LISTED BELOW IS THE PATIENT ANSWERING THE QUALITY-OF-LIFE ASSESSMENT QUESTIONS ABOUT: NONE OF THE ABOVE
RATE HOW EMOTIONALLY BOTHERED YOU ARE BY YOUR SKIN PROBLEM (FOR EXAMPLE, WORRY, EMBARRASSMENT, FRUSTRATION): 0 - NEVER BOTHERED
RATE HOW BOTHERED YOU ARE BY EFFECTS OF YOUR SKIN PROBLEMS ON YOUR ACTIVITIES (EG, GOING OUT, ACCOMPLISHING WHAT YOU WANT, WORK ACTIVITIES OR YOUR RELATIONSHIPS WITH OTHERS): 0 - NEVER BOTHERED
RATE HOW BOTHERED YOU ARE BY EFFECTS OF YOUR SKIN PROBLEMS ON YOUR ACTIVITIES (EG, GOING OUT, ACCOMPLISHING WHAT YOU WANT, WORK ACTIVITIES OR YOUR RELATIONSHIPS WITH OTHERS): 0 - NEVER BOTHERED
RATE HOW BOTHERED YOU ARE BY SYMPTOMS OF YOUR SKIN PROBLEM (EG, ITCHING, STINGING BURNING, HURTING OR SKIN IRRITATION): 2
RATE HOW BOTHERED YOU ARE BY SYMPTOMS OF YOUR SKIN PROBLEM (EG, ITCHING, STINGING BURNING, HURTING OR SKIN IRRITATION): 2

## 2024-10-15 ASSESSMENT — PATIENT GLOBAL ASSESSMENT (PGA): WHAT IS THE PGA: PATIENT GLOBAL ASSESSMENT:  1 - CLEAR

## 2024-10-16 ENCOUNTER — APPOINTMENT (OUTPATIENT)
Dept: DERMATOLOGY | Facility: CLINIC | Age: 84
End: 2024-10-16
Payer: MEDICARE

## 2024-10-16 DIAGNOSIS — Z79.899 OTHER LONG TERM (CURRENT) DRUG THERAPY: ICD-10-CM

## 2024-10-16 DIAGNOSIS — L12.0 BULLOUS PEMPHIGOID (MULTI): Primary | ICD-10-CM

## 2024-10-16 PROCEDURE — 99213 OFFICE O/P EST LOW 20 MIN: CPT | Performed by: DERMATOLOGY

## 2024-10-16 PROCEDURE — G2211 COMPLEX E/M VISIT ADD ON: HCPCS | Performed by: DERMATOLOGY

## 2024-10-16 RX ORDER — PREDNISONE 1 MG/1
3 TABLET ORAL EVERY MORNING
Qty: 90 TABLET | Refills: 6 | Status: SHIPPED | OUTPATIENT
Start: 2024-10-16 | End: 2025-10-11

## 2024-10-28 ENCOUNTER — HOSPITAL ENCOUNTER (OUTPATIENT)
Dept: RADIOLOGY | Facility: CLINIC | Age: 84
Discharge: HOME | End: 2024-10-28
Payer: MEDICARE

## 2024-10-28 ENCOUNTER — OFFICE VISIT (OUTPATIENT)
Dept: ORTHOPEDIC SURGERY | Facility: CLINIC | Age: 84
End: 2024-10-28
Payer: MEDICARE

## 2024-10-28 VITALS — BODY MASS INDEX: 30.3 KG/M2 | WEIGHT: 171 LBS | HEIGHT: 63 IN

## 2024-10-28 DIAGNOSIS — G89.29 BILATERAL CHRONIC KNEE PAIN: Primary | ICD-10-CM

## 2024-10-28 DIAGNOSIS — M25.562 BILATERAL CHRONIC KNEE PAIN: Primary | ICD-10-CM

## 2024-10-28 DIAGNOSIS — M17.10 ARTHRITIS OF KNEE: ICD-10-CM

## 2024-10-28 DIAGNOSIS — M25.561 BILATERAL CHRONIC KNEE PAIN: Primary | ICD-10-CM

## 2024-10-28 PROCEDURE — 1036F TOBACCO NON-USER: CPT | Performed by: ORTHOPAEDIC SURGERY

## 2024-10-28 PROCEDURE — 73560 X-RAY EXAM OF KNEE 1 OR 2: CPT | Mod: 50

## 2024-10-28 PROCEDURE — 99213 OFFICE O/P EST LOW 20 MIN: CPT | Performed by: ORTHOPAEDIC SURGERY

## 2024-10-28 PROCEDURE — 2500000004 HC RX 250 GENERAL PHARMACY W/ HCPCS (ALT 636 FOR OP/ED): Performed by: ORTHOPAEDIC SURGERY

## 2024-10-28 PROCEDURE — 1125F AMNT PAIN NOTED PAIN PRSNT: CPT | Performed by: ORTHOPAEDIC SURGERY

## 2024-10-28 PROCEDURE — 1159F MED LIST DOCD IN RCRD: CPT | Performed by: ORTHOPAEDIC SURGERY

## 2024-10-28 PROCEDURE — 20610 DRAIN/INJ JOINT/BURSA W/O US: CPT | Mod: 50 | Performed by: ORTHOPAEDIC SURGERY

## 2024-10-28 RX ORDER — METHYLPREDNISOLONE ACETATE 40 MG/ML
40 INJECTION, SUSPENSION INTRA-ARTICULAR; INTRALESIONAL; INTRAMUSCULAR; SOFT TISSUE
Status: COMPLETED | OUTPATIENT
Start: 2024-10-28 | End: 2024-10-28

## 2024-10-28 RX ORDER — LIDOCAINE HYDROCHLORIDE 20 MG/ML
2 INJECTION, SOLUTION INFILTRATION; PERINEURAL
Status: COMPLETED | OUTPATIENT
Start: 2024-10-28 | End: 2024-10-28

## 2024-10-28 ASSESSMENT — PAIN DESCRIPTION - DESCRIPTORS: DESCRIPTORS: SHARP;SHOOTING;ACHING

## 2024-10-28 ASSESSMENT — PAIN - FUNCTIONAL ASSESSMENT: PAIN_FUNCTIONAL_ASSESSMENT: 0-10

## 2024-10-28 ASSESSMENT — PAIN SCALES - GENERAL: PAINLEVEL_OUTOF10: 6

## 2024-10-30 ENCOUNTER — TELEPHONE (OUTPATIENT)
Dept: ORTHOPEDIC SURGERY | Facility: CLINIC | Age: 84
End: 2024-10-30
Payer: MEDICARE

## 2024-11-01 ENCOUNTER — APPOINTMENT (OUTPATIENT)
Dept: HEMATOLOGY/ONCOLOGY | Facility: CLINIC | Age: 84
End: 2024-11-01
Payer: MEDICARE

## 2024-11-01 DIAGNOSIS — M17.0 BILATERAL PRIMARY OSTEOARTHRITIS OF KNEE: ICD-10-CM

## 2024-11-03 RX ORDER — HYALURONATE SODIUM 16.8MG/2ML
16.8 SYRINGE (ML) INTRAARTICULAR
Qty: 12 ML | Refills: 0 | Status: SHIPPED | OUTPATIENT
Start: 2024-11-03 | End: 2024-12-09

## 2024-11-18 DIAGNOSIS — E11.65 TYPE 2 DIABETES MELLITUS WITH HYPERGLYCEMIA, WITH LONG-TERM CURRENT USE OF INSULIN: ICD-10-CM

## 2024-11-18 DIAGNOSIS — Z79.4 TYPE 2 DIABETES MELLITUS WITH HYPERGLYCEMIA, WITH LONG-TERM CURRENT USE OF INSULIN: ICD-10-CM

## 2024-11-18 RX ORDER — BLOOD-GLUCOSE SENSOR
EACH MISCELLANEOUS
Qty: 2 EACH | Refills: 11 | Status: CANCELLED | OUTPATIENT
Start: 2024-11-18 | End: 2025-11-17

## 2024-11-20 RX ORDER — CLINDAMYCIN HYDROCHLORIDE 150 MG/1
CAPSULE ORAL
COMMUNITY
Start: 2024-08-20 | End: 2024-11-21 | Stop reason: ALTCHOICE

## 2024-11-20 RX ORDER — OXYCODONE HYDROCHLORIDE 5 MG/1
TABLET ORAL
COMMUNITY
Start: 2024-09-09 | End: 2024-11-21 | Stop reason: ALTCHOICE

## 2024-11-20 RX ORDER — DOXYCYCLINE HYCLATE 100 MG
1 TABLET ORAL
COMMUNITY
Start: 2024-09-09 | End: 2024-11-21 | Stop reason: ALTCHOICE

## 2024-11-21 ENCOUNTER — PHARMACY VISIT (OUTPATIENT)
Dept: PHARMACY | Facility: CLINIC | Age: 84
End: 2024-11-21
Payer: COMMERCIAL

## 2024-11-21 ENCOUNTER — APPOINTMENT (OUTPATIENT)
Dept: CARDIOLOGY | Facility: CLINIC | Age: 84
End: 2024-11-21
Payer: MEDICARE

## 2024-11-21 VITALS
SYSTOLIC BLOOD PRESSURE: 118 MMHG | BODY MASS INDEX: 30.83 KG/M2 | DIASTOLIC BLOOD PRESSURE: 62 MMHG | HEART RATE: 80 BPM | OXYGEN SATURATION: 96 % | HEIGHT: 63 IN | WEIGHT: 174 LBS

## 2024-11-21 DIAGNOSIS — I10 PRIMARY HYPERTENSION: ICD-10-CM

## 2024-11-21 DIAGNOSIS — I25.10 CORONARY ARTERY DISEASE INVOLVING NATIVE HEART, UNSPECIFIED VESSEL OR LESION TYPE, UNSPECIFIED WHETHER ANGINA PRESENT: Primary | ICD-10-CM

## 2024-11-21 DIAGNOSIS — R93.1 AGATSTON CAC SCORE 200-399: ICD-10-CM

## 2024-11-21 DIAGNOSIS — E78.5 DYSLIPIDEMIA: ICD-10-CM

## 2024-11-21 LAB
ATRIAL RATE: 80 BPM
P AXIS: 57 DEGREES
P OFFSET: 188 MS
P ONSET: 148 MS
PR INTERVAL: 142 MS
Q ONSET: 219 MS
QRS COUNT: 13 BEATS
QRS DURATION: 76 MS
QT INTERVAL: 398 MS
QTC CALCULATION(BAZETT): 459 MS
QTC FREDERICIA: 438 MS
R AXIS: 5 DEGREES
T AXIS: 23 DEGREES
T OFFSET: 418 MS
VENTRICULAR RATE: 80 BPM

## 2024-11-21 PROCEDURE — 99213 OFFICE O/P EST LOW 20 MIN: CPT | Performed by: INTERNAL MEDICINE

## 2024-11-21 PROCEDURE — 3074F SYST BP LT 130 MM HG: CPT | Performed by: INTERNAL MEDICINE

## 2024-11-21 PROCEDURE — 1036F TOBACCO NON-USER: CPT | Performed by: INTERNAL MEDICINE

## 2024-11-21 PROCEDURE — 1159F MED LIST DOCD IN RCRD: CPT | Performed by: INTERNAL MEDICINE

## 2024-11-21 PROCEDURE — 3078F DIAST BP <80 MM HG: CPT | Performed by: INTERNAL MEDICINE

## 2024-11-21 PROCEDURE — 93005 ELECTROCARDIOGRAM TRACING: CPT | Performed by: INTERNAL MEDICINE

## 2024-11-21 PROCEDURE — 1123F ACP DISCUSS/DSCN MKR DOCD: CPT | Performed by: INTERNAL MEDICINE

## 2024-11-21 PROCEDURE — 1160F RVW MEDS BY RX/DR IN RCRD: CPT | Performed by: INTERNAL MEDICINE

## 2024-11-21 PROCEDURE — RXMED WILLOW AMBULATORY MEDICATION CHARGE

## 2024-11-21 PROCEDURE — 99417 PROLNG OP E/M EACH 15 MIN: CPT | Performed by: INTERNAL MEDICINE

## 2024-11-21 PROCEDURE — 1125F AMNT PAIN NOTED PAIN PRSNT: CPT | Performed by: INTERNAL MEDICINE

## 2024-11-21 RX ORDER — HYDROCHLOROTHIAZIDE 12.5 MG/1
CAPSULE ORAL
Qty: 6 EACH | Refills: 3 | Status: SHIPPED | OUTPATIENT
Start: 2024-11-21

## 2024-11-21 ASSESSMENT — ENCOUNTER SYMPTOMS
OCCASIONAL FEELINGS OF UNSTEADINESS: 1
LOSS OF SENSATION IN FEET: 1
DEPRESSION: 0

## 2024-11-21 ASSESSMENT — COLUMBIA-SUICIDE SEVERITY RATING SCALE - C-SSRS
2. HAVE YOU ACTUALLY HAD ANY THOUGHTS OF KILLING YOURSELF?: NO
6. HAVE YOU EVER DONE ANYTHING, STARTED TO DO ANYTHING, OR PREPARED TO DO ANYTHING TO END YOUR LIFE?: NO
1. IN THE PAST MONTH, HAVE YOU WISHED YOU WERE DEAD OR WISHED YOU COULD GO TO SLEEP AND NOT WAKE UP?: NO

## 2024-11-21 ASSESSMENT — PAIN SCALES - GENERAL: PAINLEVEL_OUTOF10: 7

## 2024-11-21 NOTE — PROGRESS NOTES
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 female who presents to the [facility] for follow up of elevated CT calcium score and coronary arteriosclerosis. Last seen in May 2024.     Since our last visit, Rev. Chilel has no active cardiac symptoms of PND, orthopnea, BERNARDA, palpitations, syncope, or claudication. Dyspnea on exertion with walking 1-2 blocks that correlates with more knee arthritis. Is using a cane for support now. Some episodes of atypical chest discomfort at rest (< 1 x/month) that occur for a few minutes. No chest discomfort with activity or provoked by exertional dyspnea.    She had recent hospitalization in early  for acute lower GI bleeding from her colon with blood transfusion (Hgb barbara 6.4).      Has reduced processed carbohydrates, increased fruits and vegetables, stopped eating fried foods. Is tolerating atorvastatin 10 mg a day without side effects and LDL < 70 on 2023 lipid panel. She is taking Jardiance 25 mg a day for cardioprotection and glycemic control.     She had a CT calcium score done 10/3/2019 with total of 337. MEJIA 10 year risk score for MI was 29% based on 2020 risk factors (diabetes type 2, family history, hypertension, dyslipidemia) prior to modification by treatment plan.     Past Medical History:  1. Coronary arteriosclerosis: 10/3/2019 CT calcium score 337; MEJIA 10 year risk score for MI 29% (high risk).  2. Dyslipidemia: 8/15/2019: ; ; HDL 29; LDL 87  3. Diabetes Mellitus, type 2  4. Hypertension  5. Crohns' disease  6. h/o Breast cancer  7. Bullous pemphigus  8. Asthma     Social History:  Never a smoker     Family History:  Mother had heart disease and ESRD. 2 brothers had MI and ESRD. Sister recently  of a heart attack.     Review of Systems    A 14 point review of systems was asked. All questions were negative except for pertinent positives listed in the HPI.      Objective   Physical Exam  BP " "Readings from Last 3 Encounters:   24 118/62   07/15/24 136/72   24 124/55      Wt Readings from Last 3 Encounters:   24 78.9 kg (174 lb)   10/28/24 77.6 kg (171 lb)   07/15/24 77.6 kg (171 lb)      BMI: Estimated body mass index is 31.32 kg/m² as calculated from the following:    Height as of this encounter: 1.588 m (5' 2.5\").    Weight as of this encounter: 78.9 kg (174 lb).  BSA: Estimated body surface area is 1.87 meters squared as calculated from the following:    Height as of this encounter: 1.588 m (5' 2.5\").    Weight as of this encounter: 78.9 kg (174 lb).    General: no acute distress  HEENT: EOMI, no scleral icterus.  Lungs: Clear to auscultation bilaterally without wheezing, rales, or rhonchi.  Cardiovascular: Regular rhythm and rate. Normal S1 and S2. No murmurs, rubs, or gallops are appreciated. JVP normal.  Abdomen: Soft, nontender, nondistended. Bowel sounds present.  Extremities: Warm and well perfused with equal 2+ pulses bilaterally.  No edema present.  Neurologic: Alert and oriented x3.    I have personally reviewed the following images and laboratory findings:  Last echocardiogram:   n/a    Last cath / stress test / CACS:   10/3/2019 CT calcium score 337    Most recent EC2024 ECG: Sinus rhythm, 80 bpm, normal ECG. Personally reviewed in office.    2023 ECG: Sinus rhythm, 76 bpm, normal ECG. Personally reviewed in office.    Lab Results   Component Value Date    CHOL 150 07/15/2024    CHOL 163 2024    CHOL 144 2023     Lab Results   Component Value Date    HDL 35.6 07/15/2024    HDL 38.7 2024    HDL 40.2 2023     Lab Results   Component Value Date    LDLCALC 86 07/15/2024    LDLCALC 77 2024     Lab Results   Component Value Date    TRIG 142 07/15/2024    TRIG 236 (H) 2024    TRIG 175 (H) 2023     No components found for: \"CHOLHDL\"   2023 LDL 69     Assessment/Plan   1. CAD:  10/2019 CT calcium score is 337 with MEJIA " 10 year risk score for MI of 29% (high risk)  Risk factors include: type 2 diabetes, hypertension, dyslipidemia (low HDL, high TGs), family history, and inflammatory autoimmune disease (Crohns disease).  - hold aspirin 81 mg a day completely for recent 2023 colonic bleeding with hospitalization and prior 2021 GI bleeding history  - continue atorvastatin 10 mg a day. LDL < 100 and near long term goal < 70 on July 2024 lipid panel. Continue statin for reduction of coronary artery plaque volume.   - continue blood pressure control with amlodipine 10 mg  - continue Jardiance 10 mg a day. Continuing this SGLT2 inhibitor will reduce CV risk by 20% given diabetes type 2 status and CT calcium score evidence of atherosclerosis.     Has had some atypical chest discomfort episodes 1x/month at rest but not with exertion. No pattern of provocation. Likely from the chest wall. ECG today is normal. Continue medical management of coronary arteriosclerosis. If symptoms become more frequent or occur with activity, will order stress testing.     2. Cardiac murmur:  Flow murmur at the aortic valve position radiating across precordium and to carotids. This sounds more like an aortic sclerosis murmur with hyperdynamic LV function rather than aortic stenosis. Will monitor and order echocardiogram if murmur quality changes over time. There is left carotid sound referral but both ICA < 50% on 3/22/2022 duplex study.    3. Pedal edema:  Continue furosemide 20 mg a day as needed for 2-3 lb weight gain. Taking 1x/week with good control of pedal edema.     Follow up with Dr. Hilton in 6 months            SIGNATURE: Rhys Hilton MD PATIENT NAME: Rand Chilel   DATE/TIME: November 21, 2024 11:14 AM MRN: [id number]

## 2024-11-21 NOTE — PATIENT INSTRUCTIONS
You were seen in the [facility] for your for coronary atherosclerosis (hardening of the heart arteries).      Your ECG today is normal. Your heart exam was normal except for a flow murmur that is unchanged from our last visit together. We will monitor murmur at follow up visits and order an echocardiogram (ultrasound of the heart) if it changes.     Your October 2019 CT calcium score was high at 337. Your 10 year risk of having a heart attack with this calcium score is 29% based on your current risk factors (diabetes type 2, family history, blood pressure, cholesterol numbers).     I recommend that we do the following to reduce your heart attack risk:  1. I recommended that we stop using aspirin for you as you have had episodes of GI bleeding that required hospitalization to manage with blood transfusions at an earlier office visit together.    2. Continue atorvastatin 10 mg a day. This medication is reducing your cholesterol numbers by blocking your liver from making too much cholesterol and can help remove cholesterol plaque from the heart artery walls. Your cholesterol levels were at goal on July 2024 blood work with a low LDL (bad) cholesterol level of 86 near your long term goal of LDL < 70 and < 100 initial goal.     3. Diabetes risk for heart disease can be reduced using a class of medication called SGLT2 inhibitors (they make the kidneys lose extra blood sugar and this protects the heart). Continue to take Jardiance 25 mg a day.     4. Continue amlodipine 10 mg a day for better blood pressure control. Your blood pressure is at goal range 120-130 mm Hg.    5. Moderate intensity exercise at least 3 times a week for 30-45 minutes a time. Exercise helps protect the heart and blood vessels.    6. Eat a heart healthy diet. Limit portions of red meat. Eat fresh fruits and vegetables instead of processed carbohydrates. Olive oil (1 tablespoon a day) as a source of omega-3 fat. The  Mediterranean diet has been shown in clinical trials to reduce risk of heart disease by following these principles.     For your ankle swelling (edema) continue to take Lasix (furosemide) 20 mg tablets as needed. You can take 1 in the morning for 2-3 pound weight gain or ankle swelling. This water pill starts to work within 30 minutes of taking the tablet. Your ankle swelling is now well controlled taking about once per week.    Follow up with Dr. Hilton in 6 months.

## 2024-12-02 ENCOUNTER — OFFICE VISIT (OUTPATIENT)
Dept: ORTHOPEDIC SURGERY | Facility: CLINIC | Age: 84
End: 2024-12-02
Payer: MEDICARE

## 2024-12-02 DIAGNOSIS — M25.561 BILATERAL CHRONIC KNEE PAIN: Primary | ICD-10-CM

## 2024-12-02 DIAGNOSIS — M17.10 ARTHRITIS OF KNEE: ICD-10-CM

## 2024-12-02 DIAGNOSIS — M25.562 BILATERAL CHRONIC KNEE PAIN: Primary | ICD-10-CM

## 2024-12-02 DIAGNOSIS — G89.29 BILATERAL CHRONIC KNEE PAIN: Primary | ICD-10-CM

## 2024-12-02 PROCEDURE — 1159F MED LIST DOCD IN RCRD: CPT | Performed by: ORTHOPAEDIC SURGERY

## 2024-12-02 PROCEDURE — 99213 OFFICE O/P EST LOW 20 MIN: CPT | Performed by: ORTHOPAEDIC SURGERY

## 2024-12-02 PROCEDURE — 2500000004 HC RX 250 GENERAL PHARMACY W/ HCPCS (ALT 636 FOR OP/ED): Mod: JZ | Performed by: ORTHOPAEDIC SURGERY

## 2024-12-02 PROCEDURE — 20610 DRAIN/INJ JOINT/BURSA W/O US: CPT | Mod: 50 | Performed by: ORTHOPAEDIC SURGERY

## 2024-12-02 PROCEDURE — 1123F ACP DISCUSS/DSCN MKR DOCD: CPT | Performed by: ORTHOPAEDIC SURGERY

## 2024-12-02 PROCEDURE — 1036F TOBACCO NON-USER: CPT | Performed by: ORTHOPAEDIC SURGERY

## 2024-12-02 NOTE — PROGRESS NOTES
"Here to begin Gelsyn injections.  1 week relief with cortisone injections.    Exam: Unchanged.  No obvious effusion.    Assessment: Bilateral knee arthrosis.    Plan: Discussed nonoperative and operative options in detail.   Risk and benefits discussed in detail. All questions answered today.  Recovery timeline and expectations discussed in detail.  Discussed expectations of gel injections.  Recommend icing tonight.  Will follow-up over the next 2 weeks.  After informed consent under sterile conditions the both knees were injected with Gelsyn. Risks and benefits were discussed in detail.  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    L Inj/Asp: bilateral knee on 12/2/2024 11:18 AM  Indications: pain  Details: 21 G needle, anterolateral approach  Medications (Right): 16.8 mg sodium hyaluronate 16.8 mg/2 mL  Medications (Left): 16.8 mg sodium hyaluronate 16.8 mg/2 mL  Outcome: tolerated well, no immediate complications  Procedure, treatment alternatives, risks and benefits explained, specific risks discussed. Consent was given by the patient. Immediately prior to procedure a time out was called to verify the correct patient, procedure, equipment, support staff and site/side marked as required. Patient was prepped and draped in the usual sterile fashion.         "

## 2024-12-09 ENCOUNTER — OFFICE VISIT (OUTPATIENT)
Dept: ORTHOPEDIC SURGERY | Facility: CLINIC | Age: 84
End: 2024-12-09
Payer: MEDICARE

## 2024-12-09 DIAGNOSIS — M25.562 BILATERAL CHRONIC KNEE PAIN: Primary | ICD-10-CM

## 2024-12-09 DIAGNOSIS — M25.561 BILATERAL CHRONIC KNEE PAIN: Primary | ICD-10-CM

## 2024-12-09 DIAGNOSIS — M17.10 ARTHRITIS OF KNEE: ICD-10-CM

## 2024-12-09 DIAGNOSIS — G89.29 BILATERAL CHRONIC KNEE PAIN: Primary | ICD-10-CM

## 2024-12-09 PROCEDURE — 1123F ACP DISCUSS/DSCN MKR DOCD: CPT | Performed by: ORTHOPAEDIC SURGERY

## 2024-12-09 PROCEDURE — 20610 DRAIN/INJ JOINT/BURSA W/O US: CPT | Mod: 50 | Performed by: ORTHOPAEDIC SURGERY

## 2024-12-09 PROCEDURE — 2500000004 HC RX 250 GENERAL PHARMACY W/ HCPCS (ALT 636 FOR OP/ED): Mod: JZ | Performed by: ORTHOPAEDIC SURGERY

## 2024-12-09 PROCEDURE — 1159F MED LIST DOCD IN RCRD: CPT | Performed by: ORTHOPAEDIC SURGERY

## 2024-12-09 PROCEDURE — 1036F TOBACCO NON-USER: CPT | Performed by: ORTHOPAEDIC SURGERY

## 2024-12-09 NOTE — PROGRESS NOTES
"Here for second Gelsyn injections.  Minimal change with last week's injection.  Exam: Unchanged.  No obvious effusion.    Assessment: Bilateral knee arthrosis.    Plan: Discussed nonoperative and operative options in detail.   Risk and benefits discussed in detail. All questions answered today.  Recovery timeline and expectations discussed in detail.  Discussed expectations of gel injections.  Recommend icing tonight.  Will follow-up next week.  After informed consent under sterile conditions the both knees were injected with Gelsyn. Risks and benefits were discussed in detail.  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    L Inj/Asp: bilateral knee on 12/9/2024 12:42 PM  Indications: pain  Details: 21 G needle, anterolateral approach  Medications (Right): 16.8 mg sodium hyaluronate 16.8 mg/2 mL  Medications (Left): 16.8 mg sodium hyaluronate 16.8 mg/2 mL  Outcome: tolerated well, no immediate complications  Procedure, treatment alternatives, risks and benefits explained, specific risks discussed. Consent was given by the patient. Immediately prior to procedure a time out was called to verify the correct patient, procedure, equipment, support staff and site/side marked as required. Patient was prepped and draped in the usual sterile fashion.         "

## 2024-12-16 ENCOUNTER — OFFICE VISIT (OUTPATIENT)
Dept: ORTHOPEDIC SURGERY | Facility: CLINIC | Age: 84
End: 2024-12-16
Payer: MEDICARE

## 2024-12-16 DIAGNOSIS — G89.29 BILATERAL CHRONIC KNEE PAIN: Primary | ICD-10-CM

## 2024-12-16 DIAGNOSIS — M25.561 BILATERAL CHRONIC KNEE PAIN: Primary | ICD-10-CM

## 2024-12-16 DIAGNOSIS — M17.10 ARTHRITIS OF KNEE: ICD-10-CM

## 2024-12-16 DIAGNOSIS — M25.562 BILATERAL CHRONIC KNEE PAIN: Primary | ICD-10-CM

## 2024-12-16 PROCEDURE — 1123F ACP DISCUSS/DSCN MKR DOCD: CPT | Performed by: ORTHOPAEDIC SURGERY

## 2024-12-16 PROCEDURE — 1159F MED LIST DOCD IN RCRD: CPT | Performed by: ORTHOPAEDIC SURGERY

## 2024-12-16 PROCEDURE — 2500000004 HC RX 250 GENERAL PHARMACY W/ HCPCS (ALT 636 FOR OP/ED): Mod: JZ | Performed by: ORTHOPAEDIC SURGERY

## 2024-12-16 PROCEDURE — 1036F TOBACCO NON-USER: CPT | Performed by: ORTHOPAEDIC SURGERY

## 2024-12-16 PROCEDURE — 20610 DRAIN/INJ JOINT/BURSA W/O US: CPT | Mod: 50 | Performed by: ORTHOPAEDIC SURGERY

## 2024-12-16 NOTE — PROGRESS NOTES
"Here for third Gelsyn injections.  Minimal change with last week's injection.  Exam: Unchanged.  No obvious effusion.    Assessment: Bilateral knee arthrosis.    Plan: Discussed nonoperative and operative options in detail.   Risk and benefits discussed in detail. All questions answered today.  Recovery timeline and expectations discussed in detail.  Discussed expectations of gel injections.  Recommend icing tonight.  Will follow-up next week.  Referred to physical medicine for some back issues.  After informed consent under sterile conditions the both knees were injected with Gelsyn. Risks and benefits were discussed in detail.  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    L Inj/Asp: bilateral knee on 12/16/2024 11:24 AM  Indications: pain  Details: 21 G needle, anterolateral approach  Medications (Right): 16.8 mg sodium hyaluronate 16.8 mg/2 mL  Medications (Left): 16.8 mg sodium hyaluronate 16.8 mg/2 mL  Outcome: tolerated well, no immediate complications  Procedure, treatment alternatives, risks and benefits explained, specific risks discussed. Consent was given by the patient. Immediately prior to procedure a time out was called to verify the correct patient, procedure, equipment, support staff and site/side marked as required. Patient was prepped and draped in the usual sterile fashion.         "

## 2024-12-17 DIAGNOSIS — E11.65 TYPE 2 DIABETES MELLITUS WITH HYPERGLYCEMIA, WITHOUT LONG-TERM CURRENT USE OF INSULIN: ICD-10-CM

## 2024-12-17 RX ORDER — EMPAGLIFLOZIN 25 MG/1
25 TABLET, FILM COATED ORAL DAILY
Qty: 30 TABLET | Refills: 0 | Status: SHIPPED | OUTPATIENT
Start: 2024-12-17

## 2024-12-20 PROCEDURE — RXMED WILLOW AMBULATORY MEDICATION CHARGE

## 2024-12-23 ENCOUNTER — PHARMACY VISIT (OUTPATIENT)
Dept: PHARMACY | Facility: CLINIC | Age: 84
End: 2024-12-23
Payer: COMMERCIAL

## 2025-01-06 DIAGNOSIS — E11.65 TYPE 2 DIABETES MELLITUS WITH HYPERGLYCEMIA, WITHOUT LONG-TERM CURRENT USE OF INSULIN: ICD-10-CM

## 2025-01-07 ENCOUNTER — APPOINTMENT (OUTPATIENT)
Dept: ENDOCRINOLOGY | Facility: CLINIC | Age: 85
End: 2025-01-07
Payer: MEDICARE

## 2025-01-07 VITALS
HEIGHT: 63 IN | WEIGHT: 173 LBS | SYSTOLIC BLOOD PRESSURE: 113 MMHG | HEART RATE: 89 BPM | BODY MASS INDEX: 30.65 KG/M2 | DIASTOLIC BLOOD PRESSURE: 63 MMHG

## 2025-01-07 DIAGNOSIS — I25.10 CORONARY ARTERY DISEASE INVOLVING NATIVE HEART, UNSPECIFIED VESSEL OR LESION TYPE, UNSPECIFIED WHETHER ANGINA PRESENT: ICD-10-CM

## 2025-01-07 DIAGNOSIS — E11.65 TYPE 2 DIABETES MELLITUS WITH HYPERGLYCEMIA, WITHOUT LONG-TERM CURRENT USE OF INSULIN: ICD-10-CM

## 2025-01-07 DIAGNOSIS — Z00.00 HEALTH CARE MAINTENANCE: Primary | ICD-10-CM

## 2025-01-07 LAB — POC HEMOGLOBIN A1C: 7.6 % (ref 4.2–6.5)

## 2025-01-07 PROCEDURE — 99214 OFFICE O/P EST MOD 30 MIN: CPT | Performed by: NURSE PRACTITIONER

## 2025-01-07 PROCEDURE — RXMED WILLOW AMBULATORY MEDICATION CHARGE

## 2025-01-07 PROCEDURE — 1159F MED LIST DOCD IN RCRD: CPT | Performed by: NURSE PRACTITIONER

## 2025-01-07 PROCEDURE — 95251 CONT GLUC MNTR ANALYSIS I&R: CPT | Performed by: NURSE PRACTITIONER

## 2025-01-07 PROCEDURE — 1160F RVW MEDS BY RX/DR IN RCRD: CPT | Performed by: NURSE PRACTITIONER

## 2025-01-07 PROCEDURE — 3078F DIAST BP <80 MM HG: CPT | Performed by: NURSE PRACTITIONER

## 2025-01-07 PROCEDURE — 1036F TOBACCO NON-USER: CPT | Performed by: NURSE PRACTITIONER

## 2025-01-07 PROCEDURE — 83036 HEMOGLOBIN GLYCOSYLATED A1C: CPT | Performed by: NURSE PRACTITIONER

## 2025-01-07 PROCEDURE — G2211 COMPLEX E/M VISIT ADD ON: HCPCS | Performed by: NURSE PRACTITIONER

## 2025-01-07 PROCEDURE — 3074F SYST BP LT 130 MM HG: CPT | Performed by: NURSE PRACTITIONER

## 2025-01-07 PROCEDURE — 1123F ACP DISCUSS/DSCN MKR DOCD: CPT | Performed by: NURSE PRACTITIONER

## 2025-01-07 RX ORDER — GLIPIZIDE 2.5 MG/1
2.5 TABLET, EXTENDED RELEASE ORAL DAILY
Qty: 100 TABLET | Refills: 2 | OUTPATIENT
Start: 2025-01-07

## 2025-01-07 RX ORDER — EMPAGLIFLOZIN 25 MG/1
25 TABLET, FILM COATED ORAL DAILY
Qty: 100 TABLET | Refills: 3 | Status: SHIPPED | OUTPATIENT
Start: 2025-01-07

## 2025-01-07 RX ORDER — GLIPIZIDE 2.5 MG/1
2.5 TABLET, EXTENDED RELEASE ORAL DAILY
Qty: 100 TABLET | Refills: 3 | Status: SHIPPED | OUTPATIENT
Start: 2025-01-07 | End: 2025-01-07 | Stop reason: ALTCHOICE

## 2025-01-07 RX ORDER — GLIPIZIDE 5 MG/1
5 TABLET, FILM COATED, EXTENDED RELEASE ORAL DAILY
Qty: 100 TABLET | Refills: 3 | Status: SHIPPED | OUTPATIENT
Start: 2025-01-07

## 2025-01-07 RX ORDER — BLOOD-GLUCOSE SENSOR
EACH MISCELLANEOUS
Qty: 2 EACH | Refills: 11 | Status: SHIPPED | OUTPATIENT
Start: 2025-01-07

## 2025-01-07 ASSESSMENT — ENCOUNTER SYMPTOMS
DEPRESSION: 0
LOSS OF SENSATION IN FEET: 1
OCCASIONAL FEELINGS OF UNSTEADINESS: 0

## 2025-01-07 NOTE — PROGRESS NOTES
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 . Initial diagnosis with diabetes was 4 years ago. The patient has a family history mother, 2 brothers, and 1 sister with diabetes.   Known complications include: None    Previously seeing PCP for diabetes care.   Last visit with me 12/2023  A1c today 7.6%.  Previous A1c 7.4% in 7/2024.     Since last visit, she reports missing an appt due to being sick   She is in need of refills  Travelled over holidays and forgot her Jardiance     Historical meds:   Metformin- intolerable, caused GI upset, rectal bleeding which lead to anemia     Current diabetes regimen is as follows:   Jardiance 25 mg daily   glipizide ER 2.5 mg once daily     Patient is using continuous glucose monitor -Car 3  The patient is currently checking the blood glucose as needed         Hypoglycemia frequency: 0%  Hypoglycemia awareness: yes     The patient comes into the office today with hyperglycemia.       ROS  General: no fever, chills or acute changes in weight in the last 6 months  Skin: no rashes, pruritis or dry skin  Cardiac: denies chest pain, heart palpitations or orthopnea  Pulmonary: denies wheezing, productive cough or exertional dyspnea      Objective    Physical Exam  Blood pressure 113/63, pulse 89, height 1.588 m (5' 2.5\"), weight 78.5 kg (173 lb).  General: not in acute distress, cooperative   Respiratory: normal respiratory effort  Musculoskeletal: normal gait - walks with cane      Current Outpatient Medications:     acetaminophen (Tylenol) 500 mg tablet, Take 2 tablets (1,000 mg) by mouth 2 times a day as needed. Take 2 tablets by mouth twice daily as needed for Pain. (typically takes once daily), Disp: , Rfl:     albuterol 2.5 mg /3 mL (0.083 %) nebulizer solution, Take 3 mL (2.5 mg) by nebulization 4 times a day as needed for wheezing or shortness of breath. Inhale by nebulizer over 5-15 minutes, Disp: , Rfl:    "  albuterol 90 mcg/actuation inhaler, USE 2 INHALATIONS BY MOUTH EVERY 4 TO 6 HOURS AS NEEDED, Disp: 51 g, Rfl: 2    amLODIPine (Norvasc) 10 mg tablet, Take 1 tablet (10 mg) by mouth once daily., Disp: 100 tablet, Rfl: 2    atorvastatin (Lipitor) 10 mg tablet, Take 1 tablet (10 mg) by mouth once daily., Disp: 100 tablet, Rfl: 2    azelastine (Astelin) 137 mcg (0.1 %) nasal spray, Administer 2 sprays into each nostril 2 times a day. Use in each nostril as directed, Disp: 30 mL, Rfl: 12    blood-glucose sensor (FreeStyle Car 3 Plus Sensor) device, Change every 15 days, Disp: 6 each, Rfl: 3    Breo Ellipta 100-25 mcg/dose inhaler, USE 1 INHALATION BY MOUTH ONCE  DAILY, Disp: 180 each, Rfl: 3    brimonidine (AlphaGAN P) 0.2 % ophthalmic solution, Administer 1 drop into affected eye(s) 3 times a day., Disp: , Rfl:     cholecalciferol (Vitamin D-3) 10 MCG (400 UNIT) tablet, 3 tablets (30 mcg)., Disp: , Rfl:     cyanocobalamin (Vitamin B-12) 1,000 mcg tablet, Take 1 tablet (1,000 mcg) by mouth once daily., Disp: , Rfl:     dorzolamide-timolol, PF, (Cosopt) 2-0.5 % ophthalmic solution, , Disp: , Rfl:     fluticasone (Flonase) 50 mcg/actuation nasal spray, Administer 2 sprays into each nostril if needed for allergies., Disp: 16 g, Rfl: 3    furosemide (Lasix) 20 mg tablet, Take 1 tablet (20 mg) by mouth once daily. Take as needed for ankle swelling or 2-3 pound weight gain overnight., Disp: 30 tablet, Rfl: 11    glipiZIDE XL (Glucotrol XL) 2.5 mg 24 hr tablet, TAKE 1 TABLET BY MOUTH ONCE  DAILY, Disp: 100 tablet, Rfl: 0    hydrocortisone 2.5 % cream, Apply 1 Application topically if needed for irritation. TO ARM/LEG, Disp: , Rfl:     inhalational spacing device inhaler, Use as directed, Disp: , Rfl:     Jardiance 25 mg, TAKE 1 TABLET BY MOUTH ONCE  DAILY, Disp: 30 tablet, Rfl: 0    lactase (Lactaid) 3,000 unit tablet, Take 1 tablet (3,000 Units) by mouth if needed. Take one tablet by mouth (with first bite) as needed for  dairy meals., Disp: , Rfl:     latanoprost (Xalatan) 0.005 % ophthalmic solution, Administer 1 drop into the left eye once daily at bedtime., Disp: , Rfl:     mesalamine (Asacol HD) 800 mg EC tablet, Take 1 tablet (800 mg) by mouth 4 times a day., Disp: , Rfl:     pantoprazole (ProtoNix) 40 mg EC tablet, TAKE 1 TABLET BY MOUTH ONCE  DAILY IN THE MORNING BEFORE A  MEAL. DO NOT CRUSH, CHEW, OR  SPLIT, Disp: 100 tablet, Rfl: 2    predniSONE (Deltasone) 1 mg tablet, Take 3 tablets (3 mg) by mouth once daily in the morning., Disp: 90 tablet, Rfl: 6    triamcinolone (Kenalog) 0.1 % cream, Twice a day to affected area. Avoid face, groin, and armpits., Disp: 80 g, Rfl: 11    Assessment/Plan   Type 2 diabetes with hyperglycemia:   CAD:  - A1c not at goal.  Up from previous.  She went out of town and had missed her Jardiance.  She reports overall higher sugars and also links to more pain.  She had gel injections to knee and did not help.  Today we discussed increasing glipizide.  Discussed side effects of hypoglycemia and for her to notify me sooner if having more lows.      Plan:   Change glipizide 5 mg once daily   Continue Jardiance 25 mg once daily  I will look at your readings in about 1 month   Notify me sooner if you are having lows less than 80   Follow up in 6 months

## 2025-01-07 NOTE — PATIENT INSTRUCTIONS
Change glipizide 5 mg once daily     Continue Jardiance 25 mg once daily    I will look at your readings in about 1 month     Notify me sooner if you are having lows less than 80     Follow up in 6 months

## 2025-01-14 ENCOUNTER — APPOINTMENT (OUTPATIENT)
Dept: PRIMARY CARE | Facility: CLINIC | Age: 85
End: 2025-01-14
Payer: MEDICARE

## 2025-01-14 VITALS
HEIGHT: 63 IN | BODY MASS INDEX: 30.65 KG/M2 | WEIGHT: 173 LBS | SYSTOLIC BLOOD PRESSURE: 101 MMHG | DIASTOLIC BLOOD PRESSURE: 61 MMHG | OXYGEN SATURATION: 96 % | HEART RATE: 74 BPM | TEMPERATURE: 97 F

## 2025-01-14 DIAGNOSIS — J45.909 ASTHMA, UNSPECIFIED ASTHMA SEVERITY, UNSPECIFIED WHETHER COMPLICATED, UNSPECIFIED WHETHER PERSISTENT (HHS-HCC): ICD-10-CM

## 2025-01-14 DIAGNOSIS — Z12.39 BREAST SCREENING: ICD-10-CM

## 2025-01-14 DIAGNOSIS — E55.9 VITAMIN D DEFICIENCY: ICD-10-CM

## 2025-01-14 DIAGNOSIS — Z12.31 ENCOUNTER FOR SCREENING MAMMOGRAM FOR MALIGNANT NEOPLASM OF BREAST: ICD-10-CM

## 2025-01-14 DIAGNOSIS — D50.0 IRON DEFICIENCY ANEMIA DUE TO CHRONIC BLOOD LOSS: ICD-10-CM

## 2025-01-14 DIAGNOSIS — Z00.00 ROUTINE GENERAL MEDICAL EXAMINATION AT A HEALTH CARE FACILITY: ICD-10-CM

## 2025-01-14 DIAGNOSIS — E78.9 LIPID DISORDER: ICD-10-CM

## 2025-01-14 DIAGNOSIS — Z00.00 ROUTINE GENERAL MEDICAL EXAMINATION AT HEALTH CARE FACILITY: Primary | ICD-10-CM

## 2025-01-14 DIAGNOSIS — R42 DIZZINESS: ICD-10-CM

## 2025-01-14 DIAGNOSIS — K50.00 CROHN'S DISEASE OF SMALL INTESTINE WITHOUT COMPLICATION (MULTI): ICD-10-CM

## 2025-01-14 DIAGNOSIS — R53.82 CHRONIC FATIGUE: ICD-10-CM

## 2025-01-14 DIAGNOSIS — E11.9 TYPE 2 DIABETES MELLITUS WITHOUT COMPLICATION, WITHOUT LONG-TERM CURRENT USE OF INSULIN (MULTI): ICD-10-CM

## 2025-01-14 DIAGNOSIS — R13.10 DYSPHAGIA, UNSPECIFIED TYPE: ICD-10-CM

## 2025-01-14 PROBLEM — C50.919 BREAST CANCER (MULTI): Status: RESOLVED | Noted: 2023-09-07 | Resolved: 2025-01-14

## 2025-01-14 PROCEDURE — 1036F TOBACCO NON-USER: CPT | Performed by: FAMILY MEDICINE

## 2025-01-14 PROCEDURE — G0439 PPPS, SUBSEQ VISIT: HCPCS | Performed by: FAMILY MEDICINE

## 2025-01-14 PROCEDURE — 99397 PER PM REEVAL EST PAT 65+ YR: CPT | Performed by: FAMILY MEDICINE

## 2025-01-14 PROCEDURE — 3074F SYST BP LT 130 MM HG: CPT | Performed by: FAMILY MEDICINE

## 2025-01-14 PROCEDURE — 3078F DIAST BP <80 MM HG: CPT | Performed by: FAMILY MEDICINE

## 2025-01-14 PROCEDURE — 1159F MED LIST DOCD IN RCRD: CPT | Performed by: FAMILY MEDICINE

## 2025-01-14 PROCEDURE — 1123F ACP DISCUSS/DSCN MKR DOCD: CPT | Performed by: FAMILY MEDICINE

## 2025-01-14 PROCEDURE — G0444 DEPRESSION SCREEN ANNUAL: HCPCS | Performed by: FAMILY MEDICINE

## 2025-01-14 RX ORDER — FLUTICASONE FUROATE AND VILANTEROL 100; 25 UG/1; UG/1
1 POWDER RESPIRATORY (INHALATION) DAILY
Qty: 180 EACH | Refills: 3 | Status: SHIPPED | OUTPATIENT
Start: 2025-01-14

## 2025-01-14 RX ORDER — AZELASTINE 1 MG/ML
2 SPRAY, METERED NASAL 2 TIMES DAILY
Qty: 30 ML | Refills: 12 | Status: SHIPPED | OUTPATIENT
Start: 2025-01-14 | End: 2026-01-14

## 2025-01-14 RX ORDER — FLUTICASONE FUROATE AND VILANTEROL TRIFENATATE 100; 25 UG/1; UG/1
1 POWDER RESPIRATORY (INHALATION) DAILY
Qty: 180 EACH | Refills: 3 | Status: SHIPPED | OUTPATIENT
Start: 2025-01-14 | End: 2025-01-14 | Stop reason: SDUPTHER

## 2025-01-14 RX ORDER — ATORVASTATIN CALCIUM 10 MG/1
10 TABLET, FILM COATED ORAL DAILY
Qty: 100 TABLET | Refills: 2 | Status: SHIPPED | OUTPATIENT
Start: 2025-01-14

## 2025-01-14 RX ORDER — PANTOPRAZOLE SODIUM 40 MG/1
40 TABLET, DELAYED RELEASE ORAL
Qty: 100 TABLET | Refills: 2 | Status: SHIPPED | OUTPATIENT
Start: 2025-01-14 | End: 2026-01-14

## 2025-01-14 RX ORDER — ALBUTEROL SULFATE 0.83 MG/ML
2.5 SOLUTION RESPIRATORY (INHALATION) 4 TIMES DAILY PRN
Qty: 75 ML | Refills: 3 | Status: SHIPPED | OUTPATIENT
Start: 2025-01-14

## 2025-01-14 RX ORDER — FLUTICASONE PROPIONATE 50 MCG
2 SPRAY, SUSPENSION (ML) NASAL AS NEEDED
Qty: 16 G | Refills: 3 | Status: SHIPPED | OUTPATIENT
Start: 2025-01-14

## 2025-01-14 ASSESSMENT — PATIENT HEALTH QUESTIONNAIRE - PHQ9
1. LITTLE INTEREST OR PLEASURE IN DOING THINGS: NOT AT ALL
2. FEELING DOWN, DEPRESSED OR HOPELESS: NOT AT ALL
SUM OF ALL RESPONSES TO PHQ9 QUESTIONS 1 AND 2: 0

## 2025-01-14 NOTE — PROGRESS NOTES
"Subjective   Reason for Visit: Rand Chilel is an 84 y.o. female here for a Medicare Wellness visit.          Reviewed all medications by prescribing practitioner or clinical pharmacist (such as prescriptions, OTCs, herbal therapies and supplements) and documented in the medical record.    HPI    Patient Care Team:  Arabella Muñiz MD as PCP - General  Arabella Muñiz MD as PCP - [employer] PCP     Review of Systems   All other systems reviewed and are negative.      Objective   Vitals:  /61   Pulse 74   Temp 36.1 °C (97 °F)   Ht 1.588 m (5' 2.5\")   Wt 78.5 kg (173 lb)   SpO2 96%   BMI 31.14 kg/m²       Physical Exam  Cardiovascular:      Heart sounds: Normal heart sounds.   Pulmonary:      Breath sounds: Normal breath sounds.   Abdominal:      Palpations: Abdomen is soft.   Musculoskeletal:         General: Normal range of motion.   Skin:     General: Skin is warm.   Neurological:      General: No focal deficit present.   Psychiatric:         Mood and Affect: Mood normal.     Assessment & Plan  Routine general medical examination at a health care facility    Orders:    CBC and Auto Differential; Future    Comprehensive Metabolic Panel; Future    Lipid Panel; Future    TSH with reflex to Free T4 if abnormal; Future    Vitamin B12; Future    Breast screening    Orders:    BI mammo bilateral screening tomosynthesis; Future    Vitamin D deficiency    Orders:    Vitamin D 25-Hydroxy,Total (for eval of Vitamin D levels); Future    Type 2 diabetes mellitus without complication, without long-term current use of insulin (Multi)    Orders:    Hemoglobin A1C; Future    Encounter for screening mammogram for malignant neoplasm of breast    Orders:    BI mammo bilateral screening tomosynthesis; Future    Chronic fatigue    Orders:    CBC and Auto Differential; Future    Iron deficiency anemia due to chronic blood loss    Orders:    Iron and TIBC; Future    Crohn's disease of small " intestine without complication (Multi)         Dysphagia, unspecified type    Orders:    pantoprazole (ProtoNix) 40 mg EC tablet; Take 1 tablet (40 mg) by mouth once daily in the morning. Take before meals. Do not crush, chew, or split.    Asthma, unspecified asthma severity, unspecified whether complicated, unspecified whether persistent ([facility])    Orders:    fluticasone furoate-vilanteroL (Breo Ellipta) 100-25 mcg/dose inhaler; Inhale 1 puff once daily.    albuterol 2.5 mg /3 mL (0.083 %) nebulizer solution; Take 3 mL (2.5 mg) by nebulization 4 times a day as needed for wheezing or shortness of breath. Inhale by nebulizer over 5-15 minutes    Dizziness    Orders:    azelastine (Astelin) 137 mcg (0.1 %) nasal spray; Administer 2 sprays into each nostril 2 times a day. Use in each nostril as directed    fluticasone (Flonase) 50 mcg/actuation nasal spray; Administer 2 sprays into each nostril if needed for allergies.    Lipid disorder    Orders:    atorvastatin (Lipitor) 10 mg tablet; Take 1 tablet (10 mg) by mouth once daily.    Routine general medical examination at health care facility    Orders:    1 Year Follow Up In Primary Care - Wellness Exam; Future           Advance Directives Discussion  16 - 20 minutes were spent discussing Advanced Care Planning (including a Living Will, Medical Power Of , as well as specific end of life choices and/or directives). The details of that discussion were documented in Advanced Directives Discussion section of the medical record.        Depression screen 5 minutes negative    She is up-to-date with her immunizations    Exam was unremarkable other than she has severe bilateral knee osteoarthritis and also I feel that the muscles are not atrophied throughout her body I explained to her that it is very important for her to participate in Silver sneakers program    Also I recommended encouraged her to talk to the orthopedic regarding knee replacement    She sees the  endocrinology clinical provider advised her to talk to them to get the diabetes medicine renewed the rest of the medication were renewed by me    I ordered the mammogram    She sees [facility] Rojas at [facility] for Crohn's disease she will be getting her colonoscopy    She sees Dr. Young-for psoriasis    She also sees the cardiologist    She has retinal disease and glaucoma and she sees ophthalmology    Advised her to come back in 6 months advised her to go to the lab to get her blood drawn

## 2025-01-14 NOTE — ASSESSMENT & PLAN NOTE
Orders:    fluticasone furoate-vilanteroL (Breo Ellipta) 100-25 mcg/dose inhaler; Inhale 1 puff once daily.    albuterol 2.5 mg /3 mL (0.083 %) nebulizer solution; Take 3 mL (2.5 mg) by nebulization 4 times a day as needed for wheezing or shortness of breath. Inhale by nebulizer over 5-15 minutes

## 2025-01-20 ENCOUNTER — PHARMACY VISIT (OUTPATIENT)
Dept: PHARMACY | Facility: CLINIC | Age: 85
End: 2025-01-20
Payer: COMMERCIAL

## 2025-02-11 ENCOUNTER — HOSPITAL ENCOUNTER (OUTPATIENT)
Dept: RADIOLOGY | Facility: CLINIC | Age: 85
Discharge: HOME | End: 2025-02-11
Payer: MEDICARE

## 2025-02-11 VITALS — WEIGHT: 173 LBS | BODY MASS INDEX: 31.14 KG/M2

## 2025-02-11 DIAGNOSIS — Z12.39 BREAST SCREENING: ICD-10-CM

## 2025-02-11 DIAGNOSIS — Z12.31 ENCOUNTER FOR SCREENING MAMMOGRAM FOR MALIGNANT NEOPLASM OF BREAST: ICD-10-CM

## 2025-02-11 PROCEDURE — 77063 BREAST TOMOSYNTHESIS BI: CPT

## 2025-02-11 PROCEDURE — 77063 BREAST TOMOSYNTHESIS BI: CPT | Performed by: RADIOLOGY

## 2025-02-11 PROCEDURE — 77067 SCR MAMMO BI INCL CAD: CPT | Performed by: RADIOLOGY

## 2025-02-12 LAB
25(OH)D3+25(OH)D2 SERPL-MCNC: 28 NG/ML (ref 30–100)
ALBUMIN SERPL-MCNC: 4 G/DL (ref 3.6–5.1)
ALP SERPL-CCNC: 82 U/L (ref 37–153)
ALT SERPL-CCNC: 9 U/L (ref 6–29)
ANION GAP SERPL CALCULATED.4IONS-SCNC: 7 MMOL/L (CALC) (ref 7–17)
AST SERPL-CCNC: 10 U/L (ref 10–35)
BASOPHILS # BLD AUTO: 29 CELLS/UL (ref 0–200)
BASOPHILS NFR BLD AUTO: 0.4 %
BILIRUB SERPL-MCNC: 0.4 MG/DL (ref 0.2–1.2)
BUN SERPL-MCNC: 13 MG/DL (ref 7–25)
CALCIUM SERPL-MCNC: 8.8 MG/DL (ref 8.6–10.4)
CHLORIDE SERPL-SCNC: 109 MMOL/L (ref 98–110)
CHOLEST SERPL-MCNC: 155 MG/DL
CHOLEST/HDLC SERPL: 4.1 (CALC)
CO2 SERPL-SCNC: 25 MMOL/L (ref 20–32)
CREAT SERPL-MCNC: 0.88 MG/DL (ref 0.6–0.95)
EGFRCR SERPLBLD CKD-EPI 2021: 65 ML/MIN/1.73M2
EOSINOPHIL # BLD AUTO: 158 CELLS/UL (ref 15–500)
EOSINOPHIL NFR BLD AUTO: 2.2 %
ERYTHROCYTE [DISTWIDTH] IN BLOOD BY AUTOMATED COUNT: 17.1 % (ref 11–15)
EST. AVERAGE GLUCOSE BLD GHB EST-MCNC: 192 MG/DL
EST. AVERAGE GLUCOSE BLD GHB EST-SCNC: 10.6 MMOL/L
GLUCOSE SERPL-MCNC: 152 MG/DL (ref 65–99)
HBA1C MFR BLD: 8.3 % OF TOTAL HGB
HCT VFR BLD AUTO: 36.5 % (ref 35–45)
HDLC SERPL-MCNC: 38 MG/DL
HGB BLD-MCNC: 11.2 G/DL (ref 11.7–15.5)
IRON SATN MFR SERPL: 14 % (CALC) (ref 16–45)
IRON SERPL-MCNC: 46 MCG/DL (ref 45–160)
LDLC SERPL CALC-MCNC: 89 MG/DL (CALC)
LYMPHOCYTES # BLD AUTO: 792 CELLS/UL (ref 850–3900)
LYMPHOCYTES NFR BLD AUTO: 11 %
MCH RBC QN AUTO: 24.2 PG (ref 27–33)
MCHC RBC AUTO-ENTMCNC: 30.7 G/DL (ref 32–36)
MCV RBC AUTO: 78.8 FL (ref 80–100)
MONOCYTES # BLD AUTO: 338 CELLS/UL (ref 200–950)
MONOCYTES NFR BLD AUTO: 4.7 %
NEUTROPHILS # BLD AUTO: 5882 CELLS/UL (ref 1500–7800)
NEUTROPHILS NFR BLD AUTO: 81.7 %
NONHDLC SERPL-MCNC: 117 MG/DL (CALC)
PLATELET # BLD AUTO: 276 THOUSAND/UL (ref 140–400)
PMV BLD REES-ECKER: 11.9 FL (ref 7.5–12.5)
POTASSIUM SERPL-SCNC: 4.1 MMOL/L (ref 3.5–5.3)
PROT SERPL-MCNC: 6.9 G/DL (ref 6.1–8.1)
RBC # BLD AUTO: 4.63 MILLION/UL (ref 3.8–5.1)
SODIUM SERPL-SCNC: 141 MMOL/L (ref 135–146)
TIBC SERPL-MCNC: 318 MCG/DL (CALC) (ref 250–450)
TRIGL SERPL-MCNC: 189 MG/DL
TSH SERPL-ACNC: 0.79 MIU/L (ref 0.4–4.5)
WBC # BLD AUTO: 7.2 THOUSAND/UL (ref 3.8–10.8)

## 2025-02-20 PROCEDURE — RXMED WILLOW AMBULATORY MEDICATION CHARGE

## 2025-02-21 ENCOUNTER — PHARMACY VISIT (OUTPATIENT)
Dept: PHARMACY | Facility: CLINIC | Age: 85
End: 2025-02-21
Payer: COMMERCIAL

## 2025-03-17 PROCEDURE — RXMED WILLOW AMBULATORY MEDICATION CHARGE

## 2025-03-21 ENCOUNTER — PHARMACY VISIT (OUTPATIENT)
Dept: PHARMACY | Facility: CLINIC | Age: 85
End: 2025-03-21
Payer: COMMERCIAL

## 2025-04-08 DIAGNOSIS — I10 HYPERTENSION, UNSPECIFIED TYPE: ICD-10-CM

## 2025-04-09 RX ORDER — AMLODIPINE BESYLATE 10 MG/1
10 TABLET ORAL DAILY
Qty: 100 TABLET | Refills: 2 | Status: SHIPPED | OUTPATIENT
Start: 2025-04-09

## 2025-04-15 PROCEDURE — RXMED WILLOW AMBULATORY MEDICATION CHARGE

## 2025-04-16 ENCOUNTER — APPOINTMENT (OUTPATIENT)
Dept: DERMATOLOGY | Facility: CLINIC | Age: 85
End: 2025-04-16
Payer: MEDICARE

## 2025-04-16 DIAGNOSIS — Z79.899 OTHER LONG TERM (CURRENT) DRUG THERAPY: ICD-10-CM

## 2025-04-16 DIAGNOSIS — L82.1 SEBORRHEIC KERATOSIS: ICD-10-CM

## 2025-04-16 DIAGNOSIS — L12.0 BULLOUS PEMPHIGOID: Primary | ICD-10-CM

## 2025-04-16 PROCEDURE — 1123F ACP DISCUSS/DSCN MKR DOCD: CPT | Performed by: DERMATOLOGY

## 2025-04-16 PROCEDURE — 99213 OFFICE O/P EST LOW 20 MIN: CPT | Performed by: DERMATOLOGY

## 2025-04-16 PROCEDURE — G2211 COMPLEX E/M VISIT ADD ON: HCPCS | Performed by: DERMATOLOGY

## 2025-04-16 NOTE — Clinical Note
The benign nature of these skin lesions reviewed, reassure provided and no further treatment needed at this time.   These lesions can be removed, if symptomatic (itching, bleeding, rubbing on clothing, painful), otherwise removal is considered cosmetic.

## 2025-04-16 NOTE — Clinical Note
Bullous pemphigoid - stable  - Flares quickly responding to topical corticosteroids.  - CONTINUE 3mg daily, discussed lowering to 2mg daily due to no new blisters. Patient wanted to stay at 3mg daily at this point. At next visit, we can consider lowering to 2mg daily if continued improvement  - CONTINUE Calcium and Vitamin D supplementation.   - CONTINUE Azathioprine 75 mg PO daily  - CONTINUE triamcinolone 0.1% ointment BID as needed for flares and hydrocortisone 2.5% ointment to use on flares on the face  - CBC, CMP WNL (stable) to continue tx. Standing orders in place.   - RTC 6 months for VVC

## 2025-04-16 NOTE — PROGRESS NOTES
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     Patient notes she continues to do well. No active lesions but notes some pruritus. She continues on AZA 75 mg daily and prednisone 3 mg daily.     Patient has a new itching under the breast. 3 weeks ago, she started developing itching and pain on the back and arm lasting for a minute or 2.     Review of Systems:  No other skin or systemic complaints other than what is documented elsewhere in the note.    The following portions of the chart were reviewed this encounter and updated as appropriate:          Skin Cancer History  Biopsy Log Book  No skin cancers from Specimen Tracking.    Additional History      Specialty Problems          Dermatology Problems    Bullous pemphigoid    Rash and other nonspecific skin eruption    Folliculitis    Pemphigoid    Psoriasis    SCC (squamous cell carcinoma)    SCC SERIES         Urticaria    Dermatitis, eczematoid        Objective   Well appearing patient in no apparent distress; mood and affect are within normal limits.    A focused skin examination was performed. All findings within normal limits unless otherwise noted below.    Assessment/Plan   Skin Exam  1. BULLOUS PEMPHIGOID  Generalized  No active lesions   Bullous pemphigoid - stable  - Flares quickly responding to topical corticosteroids.  - CONTINUE 3mg daily, discussed lowering to 2mg daily due to no new blisters. Patient wanted to stay at 3mg daily at this point. At next visit, we can consider lowering to 2mg daily if continued improvement  - CONTINUE Calcium and Vitamin D supplementation.   - CONTINUE Azathioprine 75 mg PO daily  - CONTINUE triamcinolone 0.1% ointment BID as needed for flares and hydrocortisone 2.5% ointment to use on flares on the face  - CBC, CMP WNL (stable) to continue tx. Standing orders in place.   - RTC 6 months for VVC  Related Procedures  Follow Up In Dermatology - Established  Patient  Follow Up In Dermatology - Established Patient  Related Medications  triamcinolone (Kenalog) 0.1 % cream  Twice a day to affected area. Avoid face, groin, and armpits.  predniSONE (Deltasone) 1 mg tablet  Take 3 tablets (3 mg) by mouth once daily in the morning.  2. OTHER LONG TERM (CURRENT) DRUG THERAPY  Generalized  No active lesions  Refer to plan above  Related Procedures  Follow Up In Dermatology - Established Patient  Related Medications  predniSONE (Deltasone) 1 mg tablet  Take 3 tablets (3 mg) by mouth once daily in the morning.  3. SEBORRHEIC KERATOSIS (2)  Left Inframammary Fold, Right Inframammary Fold  Brown, lehman waxy macules and stuck on appearing papules and plaques  The benign nature of these skin lesions reviewed, reassure provided and no further treatment needed at this time.   These lesions can be removed, if symptomatic (itching, bleeding, rubbing on clothing, painful), otherwise removal is considered cosmetic.     Eliza Martell MD   PGY4, Department of Dermatology    I saw and evaluated the patient. I personally obtained the key and critical portions of the history and physical exam or was physically present for key and critical portions performed by the resident/fellow. I reviewed the resident/fellow's documentation and discussed the patient with the resident/fellow. I agree with the resident/fellow's medical decision making as documented in the note.    Negrito Young MD PhD

## 2025-04-21 ENCOUNTER — PHARMACY VISIT (OUTPATIENT)
Dept: PHARMACY | Facility: CLINIC | Age: 85
End: 2025-04-21
Payer: COMMERCIAL

## 2025-04-30 DIAGNOSIS — R42 DIZZINESS: ICD-10-CM

## 2025-04-30 RX ORDER — FLUTICASONE PROPIONATE 50 MCG
SPRAY, SUSPENSION (ML) NASAL
Qty: 64 G | Refills: 3 | Status: SHIPPED | OUTPATIENT
Start: 2025-04-30

## 2025-05-10 DIAGNOSIS — Z79.899 OTHER LONG TERM (CURRENT) DRUG THERAPY: ICD-10-CM

## 2025-05-10 DIAGNOSIS — L12.0 BULLOUS PEMPHIGOID: ICD-10-CM

## 2025-05-13 RX ORDER — AZATHIOPRINE 50 MG/1
75 TABLET ORAL DAILY
Qty: 150 TABLET | Refills: 2 | Status: SHIPPED | OUTPATIENT
Start: 2025-05-13

## 2025-05-15 PROCEDURE — RXMED WILLOW AMBULATORY MEDICATION CHARGE

## 2025-05-19 ENCOUNTER — PHARMACY VISIT (OUTPATIENT)
Dept: PHARMACY | Facility: CLINIC | Age: 85
End: 2025-05-19
Payer: COMMERCIAL

## 2025-05-21 DIAGNOSIS — Z79.899 OTHER LONG TERM (CURRENT) DRUG THERAPY: ICD-10-CM

## 2025-05-21 DIAGNOSIS — L12.0 BULLOUS PEMPHIGOID: ICD-10-CM

## 2025-05-21 RX ORDER — PREDNISONE 1 MG/1
3 TABLET ORAL EVERY MORNING
Qty: 90 TABLET | Refills: 6 | Status: SHIPPED | OUTPATIENT
Start: 2025-05-21 | End: 2026-05-16

## 2025-05-22 ENCOUNTER — APPOINTMENT (OUTPATIENT)
Dept: CARDIOLOGY | Facility: CLINIC | Age: 85
End: 2025-05-22
Payer: MEDICARE

## 2025-05-22 VITALS
BODY MASS INDEX: 28.23 KG/M2 | HEART RATE: 75 BPM | WEIGHT: 153.4 LBS | HEIGHT: 62 IN | SYSTOLIC BLOOD PRESSURE: 121 MMHG | OXYGEN SATURATION: 97 % | DIASTOLIC BLOOD PRESSURE: 66 MMHG

## 2025-05-22 DIAGNOSIS — I25.10 CORONARY ARTERY DISEASE INVOLVING NATIVE HEART, UNSPECIFIED VESSEL OR LESION TYPE, UNSPECIFIED WHETHER ANGINA PRESENT: Primary | ICD-10-CM

## 2025-05-22 DIAGNOSIS — I10 PRIMARY HYPERTENSION: ICD-10-CM

## 2025-05-22 DIAGNOSIS — E78.5 DYSLIPIDEMIA: ICD-10-CM

## 2025-05-22 DIAGNOSIS — R93.1 AGATSTON CAC SCORE 200-399: ICD-10-CM

## 2025-05-22 DIAGNOSIS — R01.1 HEART MURMUR: ICD-10-CM

## 2025-05-22 PROCEDURE — 99214 OFFICE O/P EST MOD 30 MIN: CPT | Performed by: INTERNAL MEDICINE

## 2025-05-22 PROCEDURE — G2211 COMPLEX E/M VISIT ADD ON: HCPCS | Performed by: INTERNAL MEDICINE

## 2025-05-22 PROCEDURE — 99212 OFFICE O/P EST SF 10 MIN: CPT | Performed by: INTERNAL MEDICINE

## 2025-05-22 PROCEDURE — 1125F AMNT PAIN NOTED PAIN PRSNT: CPT | Performed by: INTERNAL MEDICINE

## 2025-05-22 PROCEDURE — 3078F DIAST BP <80 MM HG: CPT | Performed by: INTERNAL MEDICINE

## 2025-05-22 PROCEDURE — 1036F TOBACCO NON-USER: CPT | Performed by: INTERNAL MEDICINE

## 2025-05-22 PROCEDURE — 3074F SYST BP LT 130 MM HG: CPT | Performed by: INTERNAL MEDICINE

## 2025-05-22 PROCEDURE — 1159F MED LIST DOCD IN RCRD: CPT | Performed by: INTERNAL MEDICINE

## 2025-05-22 PROCEDURE — 1160F RVW MEDS BY RX/DR IN RCRD: CPT | Performed by: INTERNAL MEDICINE

## 2025-05-22 ASSESSMENT — ENCOUNTER SYMPTOMS
LOSS OF SENSATION IN FEET: 1
DEPRESSION: 0
OCCASIONAL FEELINGS OF UNSTEADINESS: 1

## 2025-05-22 ASSESSMENT — COLUMBIA-SUICIDE SEVERITY RATING SCALE - C-SSRS
6. HAVE YOU EVER DONE ANYTHING, STARTED TO DO ANYTHING, OR PREPARED TO DO ANYTHING TO END YOUR LIFE?: NO
2. HAVE YOU ACTUALLY HAD ANY THOUGHTS OF KILLING YOURSELF?: NO
1. IN THE PAST MONTH, HAVE YOU WISHED YOU WERE DEAD OR WISHED YOU COULD GO TO SLEEP AND NOT WAKE UP?: NO

## 2025-05-22 ASSESSMENT — PAIN SCALES - GENERAL: PAINLEVEL_OUTOF10: 6

## 2025-06-12 ENCOUNTER — TELEPHONE (OUTPATIENT)
Dept: HEMATOLOGY/ONCOLOGY | Facility: HOSPITAL | Age: 85
End: 2025-06-12
Payer: MEDICARE

## 2025-06-12 NOTE — TELEPHONE ENCOUNTER
Patient calls as she tripped and hit her breast 3 - 4 months ago.  Before her most recent mammogram.    She does not see a bruise.  When she puts any pressure on it she has pain.  It is her right breast where she had the cancer.    Pain is 4/10 when she presses.  When she is not pressing there is no pain at all.    She is asking if there is any testing she should do or if there was anything that she should be doing?        She sees you on 6/27      Please advise    Message sent

## 2025-06-12 NOTE — TELEPHONE ENCOUNTER
Per Luana Isbell RN patient should keep her appt and they will assess at that time.      Call to patient who states understanding and agreement via teach back.  We discussed if things are changing to call back prior to that visit.

## 2025-06-13 PROCEDURE — RXMED WILLOW AMBULATORY MEDICATION CHARGE

## 2025-06-16 ENCOUNTER — PHARMACY VISIT (OUTPATIENT)
Dept: PHARMACY | Facility: CLINIC | Age: 85
End: 2025-06-16
Payer: COMMERCIAL

## 2025-06-19 ENCOUNTER — OFFICE VISIT (OUTPATIENT)
Dept: ORTHOPEDIC SURGERY | Facility: CLINIC | Age: 85
End: 2025-06-19
Payer: MEDICARE

## 2025-06-19 DIAGNOSIS — G89.29 BILATERAL CHRONIC KNEE PAIN: Primary | ICD-10-CM

## 2025-06-19 DIAGNOSIS — M17.10 ARTHRITIS OF KNEE: ICD-10-CM

## 2025-06-19 DIAGNOSIS — M25.561 BILATERAL CHRONIC KNEE PAIN: Primary | ICD-10-CM

## 2025-06-19 DIAGNOSIS — E11.69 TYPE 2 DIABETES MELLITUS WITH OTHER SPECIFIED COMPLICATION, UNSPECIFIED WHETHER LONG TERM INSULIN USE (MULTI): ICD-10-CM

## 2025-06-19 DIAGNOSIS — M25.562 BILATERAL CHRONIC KNEE PAIN: Primary | ICD-10-CM

## 2025-06-19 PROCEDURE — 1036F TOBACCO NON-USER: CPT | Performed by: ORTHOPAEDIC SURGERY

## 2025-06-19 PROCEDURE — 99214 OFFICE O/P EST MOD 30 MIN: CPT | Performed by: ORTHOPAEDIC SURGERY

## 2025-06-19 PROCEDURE — 1159F MED LIST DOCD IN RCRD: CPT | Performed by: ORTHOPAEDIC SURGERY

## 2025-06-19 RX ORDER — PREGABALIN 25 MG/1
75 CAPSULE ORAL ONCE
OUTPATIENT
Start: 2025-06-19 | End: 2025-06-19

## 2025-06-19 RX ORDER — CEFAZOLIN SODIUM 2 G/100ML
2 INJECTION, SOLUTION INTRAVENOUS ONCE
OUTPATIENT
Start: 2025-06-19 | End: 2025-06-19

## 2025-06-19 RX ORDER — ACETAMINOPHEN 325 MG/1
975 TABLET ORAL ONCE
OUTPATIENT
Start: 2025-06-19 | End: 2025-06-19

## 2025-06-19 RX ORDER — MELOXICAM 7.5 MG/1
7.5 TABLET ORAL ONCE
OUTPATIENT
Start: 2025-06-19 | End: 2025-06-19

## 2025-06-19 NOTE — PROGRESS NOTES
Returns of both knees.  Left knee is worse than the right.  Minimal long-term relief with the gel injections.  She is tried multiple topical and over-the-counter medications.  Pain is affecting her daily function.  She concerned about having surgery at her age.  Is really impacting her daily activity and function.    Exam: Unchanged.    I personally reviewed the following radiographic exams: X-rays of knee reviewed showing bone-on-bone medial compartment arthrosis.  Moderate patellofemoral arthritis.  Hemoglobin A1c 8.3 on 2/11/2025    Assessment: Bilateral knee arthritis.  Diabetes.    Plan: Discussed nonoperative and operative options in detail.   Risk and benefits discussed in detail. All questions answered today.  Recovery timeline and expectations discussed in detail.  Discussed knee replacement.  Discussed postop recovery.  Discussed expectations.  Will check her A1c.  Discussed joint class for discussion of perioperative issues.  Left total Knee PS  Replacement [id number] with Depuy Attune implant. Anesthesia: regional and consult.  Antibiotics Kefzol 2gm. 1 gm TXA 75 minutes   Crutches or Walker  POD 0

## 2025-06-27 ENCOUNTER — OFFICE VISIT (OUTPATIENT)
Dept: HEMATOLOGY/ONCOLOGY | Facility: CLINIC | Age: 85
End: 2025-06-27
Payer: MEDICARE

## 2025-06-27 VITALS
WEIGHT: 170.75 LBS | SYSTOLIC BLOOD PRESSURE: 133 MMHG | DIASTOLIC BLOOD PRESSURE: 69 MMHG | TEMPERATURE: 97.7 F | BODY MASS INDEX: 31.23 KG/M2 | HEART RATE: 74 BPM | OXYGEN SATURATION: 99 % | RESPIRATION RATE: 16 BRPM

## 2025-06-27 DIAGNOSIS — Z12.31 ENCOUNTER FOR SCREENING MAMMOGRAM FOR MALIGNANT NEOPLASM OF BREAST: Primary | ICD-10-CM

## 2025-06-27 DIAGNOSIS — N64.4 BREAST PAIN, RIGHT: ICD-10-CM

## 2025-06-27 DIAGNOSIS — B37.2 SKIN YEAST INFECTION: ICD-10-CM

## 2025-06-27 DIAGNOSIS — Z85.3 HISTORY OF RIGHT BREAST CANCER: ICD-10-CM

## 2025-06-27 PROCEDURE — 1036F TOBACCO NON-USER: CPT | Performed by: NURSE PRACTITIONER

## 2025-06-27 PROCEDURE — 3075F SYST BP GE 130 - 139MM HG: CPT | Performed by: NURSE PRACTITIONER

## 2025-06-27 PROCEDURE — 99214 OFFICE O/P EST MOD 30 MIN: CPT | Performed by: NURSE PRACTITIONER

## 2025-06-27 PROCEDURE — 1160F RVW MEDS BY RX/DR IN RCRD: CPT | Performed by: NURSE PRACTITIONER

## 2025-06-27 PROCEDURE — 99204 OFFICE O/P NEW MOD 45 MIN: CPT | Performed by: NURSE PRACTITIONER

## 2025-06-27 PROCEDURE — 1159F MED LIST DOCD IN RCRD: CPT | Performed by: NURSE PRACTITIONER

## 2025-06-27 PROCEDURE — 3078F DIAST BP <80 MM HG: CPT | Performed by: NURSE PRACTITIONER

## 2025-06-27 PROCEDURE — 1126F AMNT PAIN NOTED NONE PRSNT: CPT | Performed by: NURSE PRACTITIONER

## 2025-06-27 RX ORDER — NYSTATIN 100000 U/G
CREAM TOPICAL 2 TIMES DAILY
Qty: 30 G | Refills: 1 | Status: SHIPPED | OUTPATIENT
Start: 2025-06-27

## 2025-06-27 ASSESSMENT — PAIN SCALES - GENERAL: PAINLEVEL_OUTOF10: 0-NO PAIN

## 2025-06-27 NOTE — PATIENT INSTRUCTIONS
Please call us at [phone] then follow the prompts.    1. Exercise 2.5 hours per week; bone strengthening, cardio-vascular, resistance training.  2. Please do self breast exams monthly.  3. Keep alcohol under 3 drinks per week.  4. Sun safety - limit sun exposure from 11a-2p when its at its hottest, apply 15-30 sun block and re-apply every 1-2 hours if perspiring or swimming.  5. Eat a plant based diet, add in oily fishes such as mackerel, tuna, and salmon.  6. Get in at least 1,000 mg of calcium per day through diet or supplement for bone strength. Examples of foods higher in calcium are milk, yogurt, fruited yogurt, oranges, fortified orange juice, almonds, almond milk, broccoli, spinach, bok rhea, mustard greens, puddings, custards, ice cream, fortified cereals, bars, and crackers.   7. Please  use nystatin powder to affected area. Please keep area clean and dry. You can use of low mode on the hair dryer.   8. Please call the office if any new mass or rash in or around breast, or any uncontrolled symptoms that last over 2-3 weeks at [phone].  9. Please schedule your annual mammogram for any day after 2-.   10. 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 I will see you back in Feb with your mammogram. Thank you for choosing [facility] with your care. Have a nice spring and summer!

## 2025-06-27 NOTE — PROGRESS NOTES
Oncology Follow-Up    Rand Chilel  [id number]        Cancer Staging   No matching staging information was found for the patient.    Cancer History:      Treatment History:    Abnormal right breast mammogram for about 2 years which was followed with serial imaging. In September 2017 she felt a small right breast lump which was further evaluated.      9/7/2017 b/l diagnostic mammogram revealed a nodule in right breast central to the nipple seen. No other significant masses, calcifications or other findings are seen in either breast. Right breast U/S results didn't correlate with the mammogram findings.  The findings showed slightly lobular margins and is indeterminate. MRI was recommended.      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     Radiation therapy from 1/29/2018 through 2/16/2018     Started Anastrozole 1 mg daily on 1/2018 through 5/22/18. Stopped because side effects with blistering skin rash. Had issues with intolerable hot flashes as well without improvement  on low dose Effexor for about 4 weeks.      Exemestane 25 mg daily was started end of July 2018 and took it for 3 weeks till mid August 2018 and stopped because of recurrent blistering skin rash     On 10/17/18 - started Tamoxifen 20 mg daily and tolerated it well until 2/2019 when she began to feel her balance was off and she was bumping into things. She stopped Tamoxifen and her balance issue is resolved. Plan was to try Tamoxifen alternate days  and then reassess 4 weeks later. She started Tamoxifen 20  mg every other day and broke out in a rash on her face, arms and chest after about 6-8 weeks     Tried 3 weeks of letrozole but had a rash on face then stopped         Subjective    Rev. Schneider presents for a problem focused visit. She fell in January on her right breast. She has had pain since. Jennie did have her screening mammogram one month after and it was negative. She describes it as more sensitive then pain. She reports no mass, discharge, open wound. RevSadi Schneider will be having a left knee replacement soon followed by a right knee replacement. She has decreased energy due to this and no distress. She has a 18 yo grandson staying with her this summer as well as a 7 year old great-great son. RevSadi Schneider denies any unusual headaches, balance issues, depression, cough, shortness of breath, problems swallowing, changes in chest/breast area, abdominal pain, bone or muscle pain, vaginal bleeding, rectal bleeding, blood in the urine, vaginal dryness, swelling arms or legs, new or unusual skin moles or lesions.       Objective      Vitals:    25 1305   BP: 133/69   Pulse: 74   Resp: 16   Temp: 36.5 °C (97.7 °F)   SpO2: 99%           Physical Exam     Lab Results   Component Value Date    WBC 7.2 2025    HGB 11.2 (L) 2025    HCT 36.5 2025    MCV 78.8 (L) 2025     2025       Chemistry    Lab Results   Component Value Date/Time     2025 1538    K 4.1 2025 1538     2025 1538    CO2 25 2025 1538    BUN 13 2025 1538    CREATININE 0.88 2025 1538    Lab Results   Component Value Date/Time    CALCIUM 8.8 2025 1538    ALKPHOS 82 2025 1538    AST 10 2025 1538    ALT 9 2025 1538    BILITOT 0.4 2025 1538         Imagin2025 15:21 [phone] [id number]     Exam Information    Status Exam Begun Exam Ended   Final 2025 14:10 2025 14:22     Study Result    Narrative & Impression   Interpreted By:   Alicia Wadsworth,   STUDY:  BI MAMMO BILATERAL SCREENING TOMOSYNTHESIS;  2/11/2025 2:22 pm      ACCESSION NUMBER(S):  [id number]      ORDERING CLINICIAN:  ELIZABETH ROJO      INDICATION:  Screening. Right lumpectomy with radiation treatment.      ,Z12.39 Encounter for other screening for malignant neoplasm of  breast,Z12.31 Encounter for screening mammogram for malignant  neoplasm of breast      COMPARISON:  01/04/2024, 10/07/2022, 10/07/2021, 10/07/2020      FINDINGS:  2D and tomosynthesis images were reviewed at 1 mm slice thickness.      Density:  There are scattered areas of fibroglandular density.      Postsurgical scarring in the deep superior right breast with scarring  and clips overlying the right axilla. Moderate circumferential skin  thickening and mild circumferential left breast skin thickening  remains stable. No suspicious masses or calcifications are identified.      IMPRESSION:  No mammographic evidence of malignancy.      BI-RADS CATEGORY:  BI-RADS Category:  2 Benign.  Recommendation:  Annual Screening.  Recommended Date:  1 Year.  Laterality:  Bilateral.           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 She is s/p partial mastectomy, XRT, and did not tolerate any of the endocrine therapies and has been on observation. She comes in today after a 2 year absence due to a fall where she hit her breast and has had discomfort since. There is no evidence of recurrent disease on today's exam.    Plan:  Exam negative.  Reviewed mammogram. Since it was about 4 weeks after her injury, if there was a hematoma, it would have shown at that time.  Suggested cut out or back on spicy foods, caffeine, chocolate, alcohol.   Try Evening Primrose Oil 2 grams in the a.m. and 1 gram in the p.m. that may help with the discomfort.  For excoriation under her breast, use hair dryer after showering to dry off that area. Will call in Nystation cream; use  twice daily for excoriation.  Encouraged monthly breast self exams, plant based diet, keep alcohol <3 drinks/week, exercise at least 2.5 hours/week.  All of RevSadi Gordillo's questions/concerns were addressed.  Over 30 minutes of time was spent with this patient with >50% of the time with education, counseling, and coordination of care.  I will see her back in February with her mammogram. She will call with any concerns prior.    Diagnoses and all orders for this visit:  Encounter for screening mammogram for malignant neoplasm of breast  -     BI mammo bilateral screening tomosynthesis; Future  -     Clinic Appointment Request Follow Up; [facility]; Future  Skin yeast infection  -     nystatin (Mycostatin) cream; Apply topically 2 times a day.  Breast pain, right  History of right breast cancer      EDWIN Murillo-CNP

## 2025-07-07 ENCOUNTER — APPOINTMENT (OUTPATIENT)
Dept: PRIMARY CARE | Facility: CLINIC | Age: 85
End: 2025-07-07
Payer: MEDICARE

## 2025-07-08 ENCOUNTER — OFFICE VISIT (OUTPATIENT)
Dept: PRIMARY CARE | Facility: CLINIC | Age: 85
End: 2025-07-08
Payer: MEDICARE

## 2025-07-08 VITALS
DIASTOLIC BLOOD PRESSURE: 68 MMHG | HEIGHT: 63 IN | BODY MASS INDEX: 30.3 KG/M2 | TEMPERATURE: 98.2 F | SYSTOLIC BLOOD PRESSURE: 128 MMHG | WEIGHT: 171 LBS | HEART RATE: 89 BPM

## 2025-07-08 DIAGNOSIS — R42 DIZZINESS: ICD-10-CM

## 2025-07-08 DIAGNOSIS — G47.00 INSOMNIA, UNSPECIFIED TYPE: Primary | ICD-10-CM

## 2025-07-08 DIAGNOSIS — J45.909 ASTHMA, UNSPECIFIED ASTHMA SEVERITY, UNSPECIFIED WHETHER COMPLICATED, UNSPECIFIED WHETHER PERSISTENT (HHS-HCC): ICD-10-CM

## 2025-07-08 DIAGNOSIS — R06.83 SNORING: ICD-10-CM

## 2025-07-08 DIAGNOSIS — J42 CHRONIC BRONCHITIS, UNSPECIFIED CHRONIC BRONCHITIS TYPE (MULTI): ICD-10-CM

## 2025-07-08 PROCEDURE — 1160F RVW MEDS BY RX/DR IN RCRD: CPT | Performed by: FAMILY MEDICINE

## 2025-07-08 PROCEDURE — 3074F SYST BP LT 130 MM HG: CPT | Performed by: FAMILY MEDICINE

## 2025-07-08 PROCEDURE — 1159F MED LIST DOCD IN RCRD: CPT | Performed by: FAMILY MEDICINE

## 2025-07-08 PROCEDURE — 1158F ADVNC CARE PLAN TLK DOCD: CPT | Performed by: FAMILY MEDICINE

## 2025-07-08 PROCEDURE — 99214 OFFICE O/P EST MOD 30 MIN: CPT | Performed by: FAMILY MEDICINE

## 2025-07-08 PROCEDURE — G8433 SCR FOR DEP NOT CPT DOC RSN: HCPCS | Performed by: FAMILY MEDICINE

## 2025-07-08 PROCEDURE — 3078F DIAST BP <80 MM HG: CPT | Performed by: FAMILY MEDICINE

## 2025-07-08 RX ORDER — AZELASTINE 1 MG/ML
2 SPRAY, METERED NASAL 2 TIMES DAILY
Qty: 30 ML | Refills: 12 | Status: SHIPPED | OUTPATIENT
Start: 2025-07-08 | End: 2026-07-08

## 2025-07-08 RX ORDER — FLUTICASONE PROPIONATE 50 MCG
2 SPRAY, SUSPENSION (ML) NASAL DAILY
Qty: 64 G | Refills: 3 | Status: SHIPPED | OUTPATIENT
Start: 2025-07-08

## 2025-07-08 RX ORDER — FLUTICASONE FUROATE AND VILANTEROL 100; 25 UG/1; UG/1
1 POWDER RESPIRATORY (INHALATION) DAILY
Qty: 180 EACH | Refills: 3 | Status: SHIPPED | OUTPATIENT
Start: 2025-07-08

## 2025-07-08 ASSESSMENT — COLUMBIA-SUICIDE SEVERITY RATING SCALE - C-SSRS
6. HAVE YOU EVER DONE ANYTHING, STARTED TO DO ANYTHING, OR PREPARED TO DO ANYTHING TO END YOUR LIFE?: NO
1. IN THE PAST MONTH, HAVE YOU WISHED YOU WERE DEAD OR WISHED YOU COULD GO TO SLEEP AND NOT WAKE UP?: NO
2. HAVE YOU ACTUALLY HAD ANY THOUGHTS OF KILLING YOURSELF?: NO

## 2025-07-08 ASSESSMENT — ENCOUNTER SYMPTOMS
LOSS OF SENSATION IN FEET: 0
OCCASIONAL FEELINGS OF UNSTEADINESS: 0
DEPRESSION: 0

## 2025-07-08 NOTE — PATIENT INSTRUCTIONS
You are being prepared to get right knee replacement surgery scheduled in September    Before that please see the cardiologist for surgical clearance    Also please see the sleep specialist to find out whether you need to have extra precautions during surgery        Make sure your diabetes is way under control otherwise there will be complication such as infection and poor healing

## 2025-07-08 NOTE — PROGRESS NOTES
This is a 84-year-old female who has hypertension hyperlipidemia coronary artery disease Crohn's disease diabetes psoriasis is here for follow-up  Breast cancer in remission    Recently she hit her right breast on the countertop and she acquired a bruise still she is feeling uncomfortable  On exam normal-reassured her    She was given a cream for tenia crura's and the tenia is healed    She is planning to get right knee replacement scheduled in September advised her to see the cardiologist for cardiac clearance    I had ordered a sleep test back in 2020 but I do not see her results therefore I referred her back to sleep apnea specialist to rule out for sleep apnea    She has COPD for which I renewed her Breo her lung exam is clear    Azelastine Nasal spray and Flonase was renewed for her allergic rhinitis    Advised her to come back in February for her annual physical

## 2025-07-10 ENCOUNTER — TELEPHONE (OUTPATIENT)
Dept: ENDOCRINOLOGY | Facility: CLINIC | Age: 85
End: 2025-07-10
Payer: MEDICARE

## 2025-07-10 NOTE — TELEPHONE ENCOUNTER
LVM APPT REMINDER PLEASE ARRIVE 15 MINUTES EARLY FOR THE APPT. OFFICE PHONE NUMBER PROVIDED FOR CALL BACK.

## 2025-07-11 ENCOUNTER — APPOINTMENT (OUTPATIENT)
Dept: ENDOCRINOLOGY | Facility: CLINIC | Age: 85
End: 2025-07-11
Payer: MEDICARE

## 2025-07-11 VITALS
TEMPERATURE: 97.7 F | SYSTOLIC BLOOD PRESSURE: 117 MMHG | HEART RATE: 73 BPM | WEIGHT: 171.7 LBS | HEIGHT: 63 IN | BODY MASS INDEX: 30.42 KG/M2 | DIASTOLIC BLOOD PRESSURE: 70 MMHG

## 2025-07-11 DIAGNOSIS — E11.65 TYPE 2 DIABETES MELLITUS WITH HYPERGLYCEMIA, WITHOUT LONG-TERM CURRENT USE OF INSULIN: Primary | ICD-10-CM

## 2025-07-11 DIAGNOSIS — I25.10 CORONARY ARTERY DISEASE INVOLVING NATIVE HEART, UNSPECIFIED VESSEL OR LESION TYPE, UNSPECIFIED WHETHER ANGINA PRESENT: ICD-10-CM

## 2025-07-11 LAB — POC HEMOGLOBIN A1C: 7.3 % (ref 4.2–6.5)

## 2025-07-11 PROCEDURE — 99214 OFFICE O/P EST MOD 30 MIN: CPT | Performed by: NURSE PRACTITIONER

## 2025-07-11 PROCEDURE — G2211 COMPLEX E/M VISIT ADD ON: HCPCS | Performed by: NURSE PRACTITIONER

## 2025-07-11 PROCEDURE — 1159F MED LIST DOCD IN RCRD: CPT | Performed by: NURSE PRACTITIONER

## 2025-07-11 PROCEDURE — 83036 HEMOGLOBIN GLYCOSYLATED A1C: CPT | Performed by: NURSE PRACTITIONER

## 2025-07-11 PROCEDURE — 3078F DIAST BP <80 MM HG: CPT | Performed by: NURSE PRACTITIONER

## 2025-07-11 PROCEDURE — 1036F TOBACCO NON-USER: CPT | Performed by: NURSE PRACTITIONER

## 2025-07-11 PROCEDURE — 1125F AMNT PAIN NOTED PAIN PRSNT: CPT | Performed by: NURSE PRACTITIONER

## 2025-07-11 PROCEDURE — 1160F RVW MEDS BY RX/DR IN RCRD: CPT | Performed by: NURSE PRACTITIONER

## 2025-07-11 PROCEDURE — 95251 CONT GLUC MNTR ANALYSIS I&R: CPT | Performed by: NURSE PRACTITIONER

## 2025-07-11 PROCEDURE — 3074F SYST BP LT 130 MM HG: CPT | Performed by: NURSE PRACTITIONER

## 2025-07-11 ASSESSMENT — ENCOUNTER SYMPTOMS
OCCASIONAL FEELINGS OF UNSTEADINESS: 1
LOSS OF SENSATION IN FEET: 0
DEPRESSION: 0

## 2025-07-11 ASSESSMENT — PATIENT HEALTH QUESTIONNAIRE - PHQ9
2. FEELING DOWN, DEPRESSED OR HOPELESS: NOT AT ALL
SUM OF ALL RESPONSES TO PHQ9 QUESTIONS 1 AND 2: 0
2. FEELING DOWN, DEPRESSED OR HOPELESS: NOT AT ALL
1. LITTLE INTEREST OR PLEASURE IN DOING THINGS: NOT AT ALL
1. LITTLE INTEREST OR PLEASURE IN DOING THINGS: NOT AT ALL
SUM OF ALL RESPONSES TO PHQ9 QUESTIONS 1 AND 2: 0

## 2025-07-11 ASSESSMENT — PAIN SCALES - GENERAL: PAINLEVEL_OUTOF10: 7

## 2025-07-11 NOTE — PATIENT INSTRUCTIONS
Continue glipizide 5 mg once daily     Continue Jardiance 25 mg once daily      Notify me sooner if you are having lows less than 80     Follow up in 6 months    
Yes

## 2025-07-11 NOTE — Clinical Note
She is optimized for knee surgery.  Given her age and co morbidities her goal for A1c is less than 7.5%.  Today she is 7.3%.  Her time in range however is 88% and estimated A1c would be 6.7%.  Thanks.  Hayley Garcia, APRN-CNP

## 2025-07-11 NOTE — PROGRESS NOTES
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 . Initial diagnosis with diabetes was 4 years ago. The patient has a family history mother, 2 brothers, and 1 sister with diabetes.   Known complications include: None    Previously seeing PCP for diabetes care.   Last visit with me 1/2025  A1c today 7.3%.  Previous A1c 8.3% in 2/2025     Since last visit, she is unsure why her A1c jumped in Feb  She does not feel she has done anything different   She reports she is scheduled for left knee replacement for 9/2025  Denies lows     Historical meds:   Metformin- intolerable, caused GI upset, rectal bleeding which lead to anemia     Current diabetes regimen is as follows:   Jardiance 25 mg daily   glipizide XL 5 mg once daily     Patient is using continuous glucose monitor -Car 3+  The patient is currently checking the blood glucose as needed         Hypoglycemia frequency: 0%  Hypoglycemia awareness: yes     The patient comes into the office today with concerns of elevated A1c.        ROS  General: no fever, chills or acute changes in weight in the last 6 months  Skin: no rashes, pruritis or dry skin  Cardiac: denies chest pain, heart palpitations or orthopnea  Pulmonary: denies wheezing, productive cough or exertional dyspnea      Objective    Physical Exam  Blood pressure 117/70, pulse 73, temperature 36.5 °C (97.7 °F), temperature source Temporal, height 1.588 m (5' 2.5\"), weight 77.9 kg (171 lb 11.2 oz), not currently breastfeeding.  General: not in acute distress, cooperative   Respiratory: normal respiratory effort  Musculoskeletal: normal gait - walking with cane today      Current Outpatient Medications:     acetaminophen (Tylenol) 500 mg tablet, Take 2 tablets (1,000 mg) by mouth 2 times a day as needed. Take 2 tablets by mouth twice daily as needed for Pain. (typically takes once daily), Disp: , Rfl:     albuterol 2.5 mg /3 mL (0.083 %) " nebulizer solution, Take 3 mL (2.5 mg) by nebulization 4 times a day as needed for wheezing or shortness of breath. Inhale by nebulizer over 5-15 minutes, Disp: 75 mL, Rfl: 3    albuterol 90 mcg/actuation inhaler, USE 2 INHALATIONS BY MOUTH EVERY 4 TO 6 HOURS AS NEEDED, Disp: 51 g, Rfl: 2    amLODIPine (Norvasc) 10 mg tablet, TAKE 1 TABLET BY MOUTH ONCE  DAILY, Disp: 100 tablet, Rfl: 2    atorvastatin (Lipitor) 10 mg tablet, Take 1 tablet (10 mg) by mouth once daily., Disp: 100 tablet, Rfl: 2    azaTHIOprine (Imuran) 50 mg tablet, TAKE 1 AND 1/2 TABLETS BY MOUTH  ONCE DAILY, Disp: 150 tablet, Rfl: 2    azelastine (Astelin) 137 mcg (0.1 %) nasal spray, Administer 2 sprays into each nostril 2 times a day. Use in each nostril as directed, Disp: 30 mL, Rfl: 12    blood-glucose sensor (FreeStyle Car 3 Plus Sensor) device, Change every 15 days, Disp: 2 each, Rfl: 11    brimonidine (AlphaGAN P) 0.2 % ophthalmic solution, Administer 1 drop into affected eye(s) 3 times a day., Disp: , Rfl:     cholecalciferol (Vitamin D-3) 10 MCG (400 UNIT) tablet, 3 tablets (30 mcg)., Disp: , Rfl:     cyanocobalamin (Vitamin B-12) 1,000 mcg tablet, Take 1 tablet (1,000 mcg) by mouth once daily., Disp: , Rfl:     dorzolamide-timolol, PF, (Cosopt) 2-0.5 % ophthalmic solution, , Disp: , Rfl:     fluticasone (Flonase) 50 mcg/actuation nasal spray, Administer 2 sprays into each nostril once daily. Shake gently. Before first use, prime pump. After use, clean tip and replace cap., Disp: 64 g, Rfl: 3    fluticasone furoate-vilanteroL (Breo Ellipta) 100-25 mcg/dose inhaler, Inhale 1 puff once daily., Disp: 180 each, Rfl: 3    furosemide (Lasix) 20 mg tablet, Take 1 tablet (20 mg) by mouth once daily. Take as needed for ankle swelling or 2-3 pound weight gain overnight., Disp: 30 tablet, Rfl: 11    glipiZIDE XL (Glucotrol XL) 5 mg 24 hr tablet, Take 1 tablet (5 mg) by mouth once daily., Disp: 100 tablet, Rfl: 3    hydrocortisone 2.5 % cream, Apply  1 Application topically if needed for irritation. TO ARM/LEG, Disp: , Rfl:     inhalational spacing device inhaler, Use as directed, Disp: , Rfl:     Jardiance 25 mg, Take 1 tablet (25 mg) by mouth once daily., Disp: 100 tablet, Rfl: 3    lactase (Lactaid) 3,000 unit tablet, Take 1 tablet (3,000 Units) by mouth if needed. Take one tablet by mouth (with first bite) as needed for dairy meals., Disp: , Rfl:     latanoprost (Xalatan) 0.005 % ophthalmic solution, Administer 1 drop into the left eye once daily at bedtime., Disp: , Rfl:     mesalamine (Asacol HD) 800 mg EC tablet, Take 1 tablet (800 mg) by mouth 4 times a day., Disp: , Rfl:     nystatin (Mycostatin) cream, Apply topically 2 times a day., Disp: 30 g, Rfl: 1    pantoprazole (ProtoNix) 40 mg EC tablet, Take 1 tablet (40 mg) by mouth once daily in the morning. Take before meals. Do not crush, chew, or split., Disp: 100 tablet, Rfl: 2    predniSONE (Deltasone) 1 mg tablet, Take 3 tablets (3 mg) by mouth once daily in the morning., Disp: 90 tablet, Rfl: 6    triamcinolone (Kenalog) 0.1 % cream, Twice a day to affected area. Avoid face, groin, and armpits., Disp: 80 g, Rfl: 11    Assessment/Plan   Type 2 diabetes with hyperglycemia:   CAD:  A1c at goal given age and co-morbidities.  Her goal is A1c < 7.5%.  She is there today.  This is improved from previous. Today discussed her car readings and sometimes when she stays elevated after meals, she this can be contributing to her elevated A1c.  Doing well with jardiance and glipizide.  Not have lows with glipizide.  Schedule for knee surgery in Sept and would be optimized for surgery at this time given her A1c and CGM results.  Her TIR is excellent at 88%.  Will continue SGLT2 given CAD history.  Discussed stopping Jardiance 3 days prior to surgery.        Plan:  Continue glipizide 5 mg once daily   Continue Jardiance 25 mg once daily  Continue Car  Notify me sooner if you are having lows less than 80   Follow  up in 6 months

## 2025-07-14 PROCEDURE — RXMED WILLOW AMBULATORY MEDICATION CHARGE

## 2025-07-16 ENCOUNTER — PHARMACY VISIT (OUTPATIENT)
Dept: PHARMACY | Facility: CLINIC | Age: 85
End: 2025-07-16
Payer: COMMERCIAL

## 2025-08-11 PROCEDURE — RXMED WILLOW AMBULATORY MEDICATION CHARGE

## 2025-08-13 ENCOUNTER — PHARMACY VISIT (OUTPATIENT)
Dept: PHARMACY | Facility: CLINIC | Age: 85
End: 2025-08-13
Payer: COMMERCIAL

## 2025-08-14 ENCOUNTER — EDUCATION (OUTPATIENT)
Dept: ORTHOPEDIC SURGERY | Facility: CLINIC | Age: 85
End: 2025-08-14
Payer: MEDICARE

## 2025-08-22 ENCOUNTER — CLINICAL SUPPORT (OUTPATIENT)
Dept: PREADMISSION TESTING | Facility: HOSPITAL | Age: 85
End: 2025-08-22
Payer: MEDICARE

## 2025-08-25 ENCOUNTER — LAB (OUTPATIENT)
Dept: LAB | Facility: HOSPITAL | Age: 85
End: 2025-08-25
Payer: MEDICARE

## 2025-08-25 ENCOUNTER — PRE-ADMISSION TESTING (OUTPATIENT)
Dept: PREADMISSION TESTING | Facility: HOSPITAL | Age: 85
End: 2025-08-25
Payer: MEDICARE

## 2025-08-25 VITALS
TEMPERATURE: 96.5 F | OXYGEN SATURATION: 100 % | RESPIRATION RATE: 18 BRPM | WEIGHT: 168.21 LBS | SYSTOLIC BLOOD PRESSURE: 113 MMHG | HEIGHT: 63 IN | DIASTOLIC BLOOD PRESSURE: 52 MMHG | BODY MASS INDEX: 29.8 KG/M2 | HEART RATE: 67 BPM

## 2025-08-25 DIAGNOSIS — E11.9 TYPE 2 DIABETES MELLITUS WITHOUT COMPLICATIONS: ICD-10-CM

## 2025-08-25 DIAGNOSIS — I10 PRIMARY HYPERTENSION: ICD-10-CM

## 2025-08-25 DIAGNOSIS — I10 ESSENTIAL (PRIMARY) HYPERTENSION: ICD-10-CM

## 2025-08-25 DIAGNOSIS — E11.9 TYPE 2 DIABETES MELLITUS WITHOUT COMPLICATION, WITHOUT LONG-TERM CURRENT USE OF INSULIN: Primary | ICD-10-CM

## 2025-08-25 DIAGNOSIS — Z01.818 PREOP TESTING: ICD-10-CM

## 2025-08-25 DIAGNOSIS — K50.919 CROHN'S DISEASE, UNSPECIFIED, WITH UNSPECIFIED COMPLICATIONS (MULTI): Primary | ICD-10-CM

## 2025-08-25 DIAGNOSIS — K50.919 CROHN'S DISEASE WITH COMPLICATION, UNSPECIFIED GASTROINTESTINAL TRACT LOCATION: ICD-10-CM

## 2025-08-25 DIAGNOSIS — K21.00 GASTROESOPHAGEAL REFLUX DISEASE WITH ESOPHAGITIS, UNSPECIFIED WHETHER HEMORRHAGE: ICD-10-CM

## 2025-08-25 DIAGNOSIS — Z01.818 PREPROCEDURAL EXAMINATION: ICD-10-CM

## 2025-08-25 DIAGNOSIS — Z85.3 HISTORY OF RIGHT BREAST CANCER: ICD-10-CM

## 2025-08-25 LAB
ABO GROUP (TYPE) IN BLOOD: NORMAL
ALBUMIN SERPL BCP-MCNC: 3.8 G/DL (ref 3.4–5)
ALP SERPL-CCNC: 75 U/L (ref 33–136)
ALT SERPL W P-5'-P-CCNC: 7 U/L (ref 7–45)
ANION GAP SERPL CALC-SCNC: 10 MMOL/L (ref 10–20)
ANTIBODY SCREEN: NORMAL
AST SERPL W P-5'-P-CCNC: 8 U/L (ref 9–39)
BB ANTIBODY IDENTIFICATION: NORMAL
BILIRUB SERPL-MCNC: 0.3 MG/DL (ref 0–1.2)
BUN SERPL-MCNC: 14 MG/DL (ref 6–23)
CALCIUM SERPL-MCNC: 8.9 MG/DL (ref 8.6–10.3)
CASE #: NORMAL
CHLORIDE SERPL-SCNC: 108 MMOL/L (ref 98–107)
CO2 SERPL-SCNC: 25 MMOL/L (ref 21–32)
CREAT SERPL-MCNC: 0.96 MG/DL (ref 0.5–1.05)
EGFRCR SERPLBLD CKD-EPI 2021: 58 ML/MIN/1.73M*2
GLUCOSE SERPL-MCNC: 136 MG/DL (ref 74–99)
POTASSIUM SERPL-SCNC: 4 MMOL/L (ref 3.5–5.3)
PROT SERPL-MCNC: 6.9 G/DL (ref 6.4–8.2)
RH FACTOR (ANTIGEN D): NORMAL
SODIUM SERPL-SCNC: 139 MMOL/L (ref 136–145)

## 2025-08-25 PROCEDURE — 80053 COMPREHEN METABOLIC PANEL: CPT

## 2025-08-25 PROCEDURE — 86901 BLOOD TYPING SEROLOGIC RH(D): CPT

## 2025-08-25 PROCEDURE — 93005 ELECTROCARDIOGRAM TRACING: CPT | Performed by: NURSE PRACTITIONER

## 2025-08-25 PROCEDURE — 86850 RBC ANTIBODY SCREEN: CPT

## 2025-08-25 PROCEDURE — 87081 CULTURE SCREEN ONLY: CPT | Mod: AHULAB | Performed by: NURSE PRACTITIONER

## 2025-08-25 PROCEDURE — 93010 ELECTROCARDIOGRAM REPORT: CPT | Performed by: INTERNAL MEDICINE

## 2025-08-25 PROCEDURE — 99204 OFFICE O/P NEW MOD 45 MIN: CPT | Performed by: NURSE PRACTITIONER

## 2025-08-25 PROCEDURE — 86900 BLOOD TYPING SEROLOGIC ABO: CPT

## 2025-08-25 RX ORDER — CHLORHEXIDINE GLUCONATE ORAL RINSE 1.2 MG/ML
15 SOLUTION DENTAL DAILY
Qty: 30 ML | Refills: 0 | Status: SHIPPED | OUTPATIENT
Start: 2025-08-25 | End: 2025-08-27

## 2025-08-25 ASSESSMENT — ENCOUNTER SYMPTOMS
ARTHRALGIAS: 1
CONSTITUTIONAL NEGATIVE: 1
GASTROINTESTINAL NEGATIVE: 1
CARDIOVASCULAR NEGATIVE: 1
RESPIRATORY NEGATIVE: 1

## 2025-08-26 ENCOUNTER — TELEPHONE (OUTPATIENT)
Dept: ORTHOPEDIC SURGERY | Facility: HOSPITAL | Age: 85
End: 2025-08-26
Payer: MEDICARE

## 2025-08-26 LAB
ATRIAL RATE: 71 BPM
P AXIS: 35 DEGREES
P OFFSET: 185 MS
P ONSET: 128 MS
PR INTERVAL: 180 MS
Q ONSET: 218 MS
QRS COUNT: 11 BEATS
QRS DURATION: 86 MS
QT INTERVAL: 426 MS
QTC CALCULATION(BAZETT): 462 MS
QTC FREDERICIA: 450 MS
R AXIS: 9 DEGREES
T AXIS: 22 DEGREES
T OFFSET: 431 MS
VENTRICULAR RATE: 71 BPM

## 2025-08-27 LAB — STAPHYLOCOCCUS SPEC CULT: NORMAL

## 2025-08-29 ENCOUNTER — ANESTHESIA EVENT (OUTPATIENT)
Dept: OPERATING ROOM | Facility: HOSPITAL | Age: 85
End: 2025-08-29
Payer: MEDICARE

## 2025-08-30 ENCOUNTER — LAB (OUTPATIENT)
Dept: LAB | Facility: HOSPITAL | Age: 85
End: 2025-08-30
Payer: MEDICARE

## 2025-08-31 LAB
ABO GROUP (TYPE) IN BLOOD: NORMAL
ANTIBODY SCREEN: NORMAL
BB ANTIBODY IDENTIFICATION: NORMAL
CASE #: NORMAL
RH FACTOR (ANTIGEN D): NORMAL

## 2025-09-02 ENCOUNTER — PHARMACY VISIT (OUTPATIENT)
Dept: PHARMACY | Facility: CLINIC | Age: 85
End: 2025-09-02
Payer: COMMERCIAL

## 2025-09-02 ENCOUNTER — ANESTHESIA (OUTPATIENT)
Dept: OPERATING ROOM | Facility: HOSPITAL | Age: 85
End: 2025-09-02
Payer: MEDICARE

## 2025-09-02 ENCOUNTER — DOCUMENTATION (OUTPATIENT)
Dept: HOME HEALTH SERVICES | Facility: HOME HEALTH | Age: 85
End: 2025-09-02

## 2025-09-02 ENCOUNTER — HOSPITAL ENCOUNTER (OUTPATIENT)
Facility: HOSPITAL | Age: 85
Setting detail: OUTPATIENT SURGERY
Discharge: HOME HEALTH | End: 2025-09-02
Attending: ORTHOPAEDIC SURGERY | Admitting: ORTHOPAEDIC SURGERY
Payer: MEDICARE

## 2025-09-02 ENCOUNTER — APPOINTMENT (OUTPATIENT)
Dept: RADIOLOGY | Facility: HOSPITAL | Age: 85
End: 2025-09-02
Payer: MEDICARE

## 2025-09-02 VITALS
RESPIRATION RATE: 18 BRPM | BODY MASS INDEX: 29.8 KG/M2 | HEART RATE: 83 BPM | HEIGHT: 63 IN | SYSTOLIC BLOOD PRESSURE: 151 MMHG | TEMPERATURE: 97.7 F | DIASTOLIC BLOOD PRESSURE: 65 MMHG | WEIGHT: 168.21 LBS | OXYGEN SATURATION: 100 %

## 2025-09-02 DIAGNOSIS — M25.561 BILATERAL CHRONIC KNEE PAIN: Primary | ICD-10-CM

## 2025-09-02 DIAGNOSIS — G89.29 BILATERAL CHRONIC KNEE PAIN: Primary | ICD-10-CM

## 2025-09-02 DIAGNOSIS — M17.10 ARTHRITIS OF KNEE: ICD-10-CM

## 2025-09-02 DIAGNOSIS — M25.562 BILATERAL CHRONIC KNEE PAIN: Primary | ICD-10-CM

## 2025-09-02 LAB
GLUCOSE BLD MANUAL STRIP-MCNC: 148 MG/DL (ref 74–99)
GLUCOSE BLD MANUAL STRIP-MCNC: 156 MG/DL (ref 74–99)

## 2025-09-02 PROCEDURE — 3600000010 HC OR TIME - EACH INCREMENTAL 1 MINUTE - PROCEDURE LEVEL FIVE: Performed by: ORTHOPAEDIC SURGERY

## 2025-09-02 PROCEDURE — 7100000001 HC RECOVERY ROOM TIME - INITIAL BASE CHARGE: Performed by: ORTHOPAEDIC SURGERY

## 2025-09-02 PROCEDURE — 97110 THERAPEUTIC EXERCISES: CPT | Mod: GP

## 2025-09-02 PROCEDURE — 3700000002 HC GENERAL ANESTHESIA TIME - EACH INCREMENTAL 1 MINUTE: Performed by: ORTHOPAEDIC SURGERY

## 2025-09-02 PROCEDURE — 82947 ASSAY GLUCOSE BLOOD QUANT: CPT

## 2025-09-02 PROCEDURE — 2500000004 HC RX 250 GENERAL PHARMACY W/ HCPCS (ALT 636 FOR OP/ED): Mod: JW | Performed by: ANESTHESIOLOGIST ASSISTANT

## 2025-09-02 PROCEDURE — 7100000009 HC PHASE TWO TIME - INITIAL BASE CHARGE: Performed by: ORTHOPAEDIC SURGERY

## 2025-09-02 PROCEDURE — 97161 PT EVAL LOW COMPLEX 20 MIN: CPT | Mod: GP

## 2025-09-02 PROCEDURE — 7100000002 HC RECOVERY ROOM TIME - EACH INCREMENTAL 1 MINUTE: Performed by: ORTHOPAEDIC SURGERY

## 2025-09-02 PROCEDURE — 27447 TOTAL KNEE ARTHROPLASTY: CPT | Performed by: ORTHOPAEDIC SURGERY

## 2025-09-02 PROCEDURE — 2500000004 HC RX 250 GENERAL PHARMACY W/ HCPCS (ALT 636 FOR OP/ED): Performed by: ORTHOPAEDIC SURGERY

## 2025-09-02 PROCEDURE — 3600000005 HC OR TIME - INITIAL BASE CHARGE - PROCEDURE LEVEL FIVE: Performed by: ORTHOPAEDIC SURGERY

## 2025-09-02 PROCEDURE — A27447 PR TOTAL KNEE ARTHROPLASTY: Performed by: ANESTHESIOLOGY

## 2025-09-02 PROCEDURE — 2500000004 HC RX 250 GENERAL PHARMACY W/ HCPCS (ALT 636 FOR OP/ED)

## 2025-09-02 PROCEDURE — 7100000010 HC PHASE TWO TIME - EACH INCREMENTAL 1 MINUTE: Performed by: ORTHOPAEDIC SURGERY

## 2025-09-02 PROCEDURE — C1776 JOINT DEVICE (IMPLANTABLE): HCPCS | Performed by: ORTHOPAEDIC SURGERY

## 2025-09-02 PROCEDURE — 2500000001 HC RX 250 WO HCPCS SELF ADMINISTERED DRUGS (ALT 637 FOR MEDICARE OP): Performed by: ANESTHESIOLOGY

## 2025-09-02 PROCEDURE — 73560 X-RAY EXAM OF KNEE 1 OR 2: CPT | Mod: LEFT SIDE | Performed by: RADIOLOGY

## 2025-09-02 PROCEDURE — 2780000003 HC OR 278 NO HCPCS: Performed by: ORTHOPAEDIC SURGERY

## 2025-09-02 PROCEDURE — RXMED WILLOW AMBULATORY MEDICATION CHARGE

## 2025-09-02 PROCEDURE — 2500000005 HC RX 250 GENERAL PHARMACY W/O HCPCS: Performed by: ANESTHESIOLOGIST ASSISTANT

## 2025-09-02 PROCEDURE — 73560 X-RAY EXAM OF KNEE 1 OR 2: CPT | Mod: LT

## 2025-09-02 PROCEDURE — 36415 COLL VENOUS BLD VENIPUNCTURE: CPT | Performed by: ORTHOPAEDIC SURGERY

## 2025-09-02 PROCEDURE — 2720000007 HC OR 272 NO HCPCS: Performed by: ORTHOPAEDIC SURGERY

## 2025-09-02 PROCEDURE — 64447 NJX AA&/STRD FEMORAL NRV IMG: CPT

## 2025-09-02 PROCEDURE — 2500000002 HC RX 250 W HCPCS SELF ADMINISTERED DRUGS (ALT 637 FOR MEDICARE OP, ALT 636 FOR OP/ED): Performed by: ORTHOPAEDIC SURGERY

## 2025-09-02 PROCEDURE — 97116 GAIT TRAINING THERAPY: CPT | Mod: GP

## 2025-09-02 PROCEDURE — 3700000001 HC GENERAL ANESTHESIA TIME - INITIAL BASE CHARGE: Performed by: ORTHOPAEDIC SURGERY

## 2025-09-02 PROCEDURE — 97530 THERAPEUTIC ACTIVITIES: CPT | Mod: GP

## 2025-09-02 PROCEDURE — 2500000001 HC RX 250 WO HCPCS SELF ADMINISTERED DRUGS (ALT 637 FOR MEDICARE OP): Performed by: ORTHOPAEDIC SURGERY

## 2025-09-02 PROCEDURE — 99100 ANES PT EXTEME AGE<1 YR&>70: CPT | Performed by: ANESTHESIOLOGY

## 2025-09-02 PROCEDURE — C1713 ANCHOR/SCREW BN/BN,TIS/BN: HCPCS | Performed by: ORTHOPAEDIC SURGERY

## 2025-09-02 PROCEDURE — 2500000005 HC RX 250 GENERAL PHARMACY W/O HCPCS: Performed by: ORTHOPAEDIC SURGERY

## 2025-09-02 PROCEDURE — A27447 PR TOTAL KNEE ARTHROPLASTY: Performed by: ANESTHESIOLOGIST ASSISTANT

## 2025-09-02 DEVICE — IMPLANTABLE DEVICE: Type: IMPLANTABLE DEVICE | Site: KNEE | Status: FUNCTIONAL

## 2025-09-02 DEVICE — TIBAL BASE ATTUNE FB, SZ 4 CEM: Type: IMPLANTABLE DEVICE | Site: KNEE | Status: FUNCTIONAL

## 2025-09-02 DEVICE — DOME, PATELLA, MEDIALIZED, 32MM: Type: IMPLANTABLE DEVICE | Site: KNEE | Status: FUNCTIONAL

## 2025-09-02 DEVICE — BONE CEMENT, CMW 2 , FAST SET, 20G: Type: IMPLANTABLE DEVICE | Site: KNEE | Status: FUNCTIONAL

## 2025-09-02 RX ORDER — TRAMADOL HYDROCHLORIDE 50 MG/1
100 TABLET, FILM COATED ORAL EVERY 6 HOURS PRN
Qty: 28 TABLET | Refills: 0 | Status: SHIPPED | OUTPATIENT
Start: 2025-09-02 | End: 2025-09-16

## 2025-09-02 RX ORDER — ROPIVACAINE HYDROCHLORIDE 5 MG/ML
INJECTION, SOLUTION EPIDURAL; INFILTRATION; PERINEURAL AS NEEDED
Status: DISCONTINUED | OUTPATIENT
Start: 2025-09-02 | End: 2025-09-02

## 2025-09-02 RX ORDER — TRANEXAMIC ACID 1 G/10ML
INJECTION, SOLUTION INTRAVENOUS AS NEEDED
Status: DISCONTINUED | OUTPATIENT
Start: 2025-09-02 | End: 2025-09-02

## 2025-09-02 RX ORDER — MELOXICAM 7.5 MG/1
7.5 TABLET ORAL DAILY
Qty: 14 TABLET | Refills: 0 | Status: SHIPPED | OUTPATIENT
Start: 2025-09-02 | End: 2025-09-16

## 2025-09-02 RX ORDER — OXYCODONE HYDROCHLORIDE 5 MG/1
5 TABLET ORAL EVERY 6 HOURS PRN
Qty: 28 TABLET | Refills: 0 | Status: SHIPPED | OUTPATIENT
Start: 2025-09-02 | End: 2025-09-09

## 2025-09-02 RX ORDER — EPINEPHRINE 1 MG/ML
INJECTION INTRAMUSCULAR; INTRAVENOUS; SUBCUTANEOUS AS NEEDED
Status: DISCONTINUED | OUTPATIENT
Start: 2025-09-02 | End: 2025-09-02

## 2025-09-02 RX ORDER — IPRATROPIUM BROMIDE 0.5 MG/2.5ML
500 SOLUTION RESPIRATORY (INHALATION) ONCE
Status: DISCONTINUED | OUTPATIENT
Start: 2025-09-02 | End: 2025-09-02 | Stop reason: HOSPADM

## 2025-09-02 RX ORDER — OXYCODONE HYDROCHLORIDE 5 MG/1
5 TABLET ORAL EVERY 4 HOURS PRN
Status: DISCONTINUED | OUTPATIENT
Start: 2025-09-02 | End: 2025-09-02 | Stop reason: HOSPADM

## 2025-09-02 RX ORDER — ONDANSETRON HYDROCHLORIDE 2 MG/ML
4 INJECTION, SOLUTION INTRAVENOUS ONCE AS NEEDED
Status: DISCONTINUED | OUTPATIENT
Start: 2025-09-02 | End: 2025-09-02 | Stop reason: HOSPADM

## 2025-09-02 RX ORDER — CEFAZOLIN 1 G/1
INJECTION, POWDER, FOR SOLUTION INTRAVENOUS AS NEEDED
Status: DISCONTINUED | OUTPATIENT
Start: 2025-09-02 | End: 2025-09-02

## 2025-09-02 RX ORDER — SODIUM CHLORIDE 0.9 G/100ML
INJECTION, SOLUTION IRRIGATION AS NEEDED
Status: DISCONTINUED | OUTPATIENT
Start: 2025-09-02 | End: 2025-09-02 | Stop reason: HOSPADM

## 2025-09-02 RX ORDER — CYCLOBENZAPRINE HCL 10 MG
10 TABLET ORAL ONCE AS NEEDED
Status: COMPLETED | OUTPATIENT
Start: 2025-09-02 | End: 2025-09-02

## 2025-09-02 RX ORDER — KETOROLAC TROMETHAMINE 30 MG/ML
15 INJECTION, SOLUTION INTRAMUSCULAR; INTRAVENOUS ONCE
Status: COMPLETED | OUTPATIENT
Start: 2025-09-02 | End: 2025-09-02

## 2025-09-02 RX ORDER — PREGABALIN 75 MG/1
75 CAPSULE ORAL ONCE
Status: COMPLETED | OUTPATIENT
Start: 2025-09-02 | End: 2025-09-02

## 2025-09-02 RX ORDER — ROPIVACAINE/EPI/CLONIDINE/KET 2.46-0.005
SYRINGE (ML) INJECTION AS NEEDED
Status: DISCONTINUED | OUTPATIENT
Start: 2025-09-02 | End: 2025-09-02 | Stop reason: HOSPADM

## 2025-09-02 RX ORDER — CEFAZOLIN SODIUM 2 G/50ML
2 SOLUTION INTRAVENOUS ONCE
Status: DISCONTINUED | OUTPATIENT
Start: 2025-09-02 | End: 2025-09-02 | Stop reason: HOSPADM

## 2025-09-02 RX ORDER — ACETAMINOPHEN 325 MG/1
650 TABLET ORAL EVERY 4 HOURS PRN
Status: DISCONTINUED | OUTPATIENT
Start: 2025-09-02 | End: 2025-09-02 | Stop reason: HOSPADM

## 2025-09-02 RX ORDER — PHENYLEPHRINE 10 MG/250 ML(40 MCG/ML)IN 0.9 % SOD.CHLORIDE INTRAVENOUS
CONTINUOUS PRN
Status: DISCONTINUED | OUTPATIENT
Start: 2025-09-02 | End: 2025-09-02

## 2025-09-02 RX ORDER — MELOXICAM 7.5 MG/1
7.5 TABLET ORAL ONCE
Status: COMPLETED | OUTPATIENT
Start: 2025-09-02 | End: 2025-09-02

## 2025-09-02 RX ORDER — SODIUM CHLORIDE, SODIUM LACTATE, POTASSIUM CHLORIDE, CALCIUM CHLORIDE 600; 310; 30; 20 MG/100ML; MG/100ML; MG/100ML; MG/100ML
100 INJECTION, SOLUTION INTRAVENOUS CONTINUOUS
Status: ACTIVE | OUTPATIENT
Start: 2025-09-02 | End: 2025-09-02

## 2025-09-02 RX ORDER — ALUMINUM HYDROXIDE, MAGNESIUM HYDROXIDE, AND SIMETHICONE 2400; 240; 2400 MG/30ML; MG/30ML; MG/30ML
5 SUSPENSION ORAL ONCE AS NEEDED
Status: DISCONTINUED | OUTPATIENT
Start: 2025-09-02 | End: 2025-09-02 | Stop reason: HOSPADM

## 2025-09-02 RX ORDER — CEFAZOLIN SODIUM 2 G/50ML
2 SOLUTION INTRAVENOUS EVERY 8 HOURS
Status: DISCONTINUED | OUTPATIENT
Start: 2025-09-02 | End: 2025-09-02 | Stop reason: HOSPADM

## 2025-09-02 RX ORDER — ALBUTEROL SULFATE 0.83 MG/ML
2.5 SOLUTION RESPIRATORY (INHALATION) ONCE AS NEEDED
Status: DISCONTINUED | OUTPATIENT
Start: 2025-09-02 | End: 2025-09-02 | Stop reason: HOSPADM

## 2025-09-02 RX ORDER — WATER 1 ML/ML
INJECTION IRRIGATION AS NEEDED
Status: DISCONTINUED | OUTPATIENT
Start: 2025-09-02 | End: 2025-09-02 | Stop reason: HOSPADM

## 2025-09-02 RX ORDER — DOCUSATE SODIUM 100 MG/1
100 CAPSULE, LIQUID FILLED ORAL 2 TIMES DAILY
Qty: 28 CAPSULE | Refills: 0 | Status: SHIPPED | OUTPATIENT
Start: 2025-09-02 | End: 2025-09-16

## 2025-09-02 RX ORDER — DROPERIDOL 2.5 MG/ML
0.62 INJECTION, SOLUTION INTRAMUSCULAR; INTRAVENOUS ONCE AS NEEDED
Status: DISCONTINUED | OUTPATIENT
Start: 2025-09-02 | End: 2025-09-02 | Stop reason: HOSPADM

## 2025-09-02 RX ORDER — TRANEXAMIC ACID 650 MG/1
1950 TABLET ORAL ONCE
Status: COMPLETED | OUTPATIENT
Start: 2025-09-02 | End: 2025-09-02

## 2025-09-02 RX ORDER — LIDOCAINE HYDROCHLORIDE 20 MG/ML
INJECTION, SOLUTION EPIDURAL; INFILTRATION; INTRACAUDAL; PERINEURAL AS NEEDED
Status: DISCONTINUED | OUTPATIENT
Start: 2025-09-02 | End: 2025-09-02

## 2025-09-02 RX ORDER — FENTANYL CITRATE 50 UG/ML
INJECTION, SOLUTION INTRAMUSCULAR; INTRAVENOUS AS NEEDED
Status: DISCONTINUED | OUTPATIENT
Start: 2025-09-02 | End: 2025-09-02

## 2025-09-02 RX ORDER — PROPOFOL 10 MG/ML
INJECTION, EMULSION INTRAVENOUS AS NEEDED
Status: DISCONTINUED | OUTPATIENT
Start: 2025-09-02 | End: 2025-09-02

## 2025-09-02 RX ORDER — NAPROXEN SODIUM 220 MG/1
81 TABLET, FILM COATED ORAL 2 TIMES DAILY
Qty: 28 TABLET | Refills: 0 | Status: SHIPPED | OUTPATIENT
Start: 2025-09-02 | End: 2025-09-16

## 2025-09-02 RX ORDER — MIDAZOLAM HYDROCHLORIDE 2 MG/2ML
INJECTION, SOLUTION INTRAMUSCULAR; INTRAVENOUS AS NEEDED
Status: DISCONTINUED | OUTPATIENT
Start: 2025-09-02 | End: 2025-09-02

## 2025-09-02 RX ORDER — ACETAMINOPHEN 325 MG/1
975 TABLET ORAL ONCE
Status: COMPLETED | OUTPATIENT
Start: 2025-09-02 | End: 2025-09-02

## 2025-09-02 RX ORDER — LIDOCAINE HYDROCHLORIDE 10 MG/ML
0.1 INJECTION, SOLUTION EPIDURAL; INFILTRATION; INTRACAUDAL; PERINEURAL ONCE
Status: DISCONTINUED | OUTPATIENT
Start: 2025-09-02 | End: 2025-09-02 | Stop reason: HOSPADM

## 2025-09-02 RX ADMIN — Medication: at 10:51

## 2025-09-02 RX ADMIN — TRANEXAMIC ACID 1950 MG: 650 TABLET ORAL at 12:40

## 2025-09-02 RX ADMIN — FENTANYL CITRATE 50 MCG: 50 INJECTION, SOLUTION INTRAMUSCULAR; INTRAVENOUS at 08:32

## 2025-09-02 RX ADMIN — LIDOCAINE HYDROCHLORIDE 50 MG: 20 INJECTION, SOLUTION EPIDURAL; INFILTRATION; INTRACAUDAL; PERINEURAL at 08:40

## 2025-09-02 RX ADMIN — CEFAZOLIN 2 G: 1 INJECTION, POWDER, FOR SOLUTION INTRAMUSCULAR; INTRAVENOUS at 08:41

## 2025-09-02 RX ADMIN — MIDAZOLAM HYDROCHLORIDE 2 MG: 1 INJECTION, SOLUTION INTRAMUSCULAR; INTRAVENOUS at 08:30

## 2025-09-02 RX ADMIN — PREGABALIN 75 MG: 75 CAPSULE ORAL at 07:04

## 2025-09-02 RX ADMIN — OXYCODONE HYDROCHLORIDE 5 MG: 5 TABLET ORAL at 12:35

## 2025-09-02 RX ADMIN — ROPIVACAINE HYDROCHLORIDE 30 ML: 5 INJECTION, SOLUTION EPIDURAL; INFILTRATION; PERINEURAL at 08:00

## 2025-09-02 RX ADMIN — TRANEXAMIC ACID 1000 MG: 100 INJECTION, SOLUTION INTRAVENOUS at 08:41

## 2025-09-02 RX ADMIN — FENTANYL CITRATE 50 MCG: 50 INJECTION, SOLUTION INTRAMUSCULAR; INTRAVENOUS at 10:15

## 2025-09-02 RX ADMIN — MIDAZOLAM HYDROCHLORIDE 2 MG: 1 INJECTION, SOLUTION INTRAMUSCULAR; INTRAVENOUS at 08:00

## 2025-09-02 RX ADMIN — DEXAMETHASONE SODIUM PHOSPHATE 4 MG: 4 INJECTION, SOLUTION INTRA-ARTICULAR; INTRALESIONAL; INTRAMUSCULAR; INTRAVENOUS; SOFT TISSUE at 08:00

## 2025-09-02 RX ADMIN — PROPOFOL 50 MG: 10 INJECTION, EMULSION INTRAVENOUS at 08:40

## 2025-09-02 RX ADMIN — KETOROLAC TROMETHAMINE 15 MG: 30 INJECTION, SOLUTION INTRAMUSCULAR at 14:25

## 2025-09-02 RX ADMIN — ACETAMINOPHEN 975 MG: 325 TABLET ORAL at 07:04

## 2025-09-02 RX ADMIN — MELOXICAM 7.5 MG: 7.5 TABLET ORAL at 07:04

## 2025-09-02 RX ADMIN — SODIUM CHLORIDE, POTASSIUM CHLORIDE, SODIUM LACTATE AND CALCIUM CHLORIDE: 600; 310; 30; 20 INJECTION, SOLUTION INTRAVENOUS at 10:33

## 2025-09-02 RX ADMIN — EPINEPHRINE 50 MCG: 1 INJECTION INTRAMUSCULAR; INTRAVENOUS; SUBCUTANEOUS at 08:00

## 2025-09-02 RX ADMIN — PHENYLEPHRINE-NACL IV SOLUTION 10 MG/250ML-0.9% 0.4 MCG/KG/MIN: 10-0.9/25 SOLUTION at 08:50

## 2025-09-02 RX ADMIN — PROPOFOL 100 MCG/KG/MIN: 10 INJECTION, EMULSION INTRAVENOUS at 08:41

## 2025-09-02 RX ADMIN — FENTANYL CITRATE 100 MCG: 50 INJECTION, SOLUTION INTRAMUSCULAR; INTRAVENOUS at 08:00

## 2025-09-02 RX ADMIN — POVIDONE-IODINE 1 APPLICATION: 5 SOLUTION TOPICAL at 07:04

## 2025-09-02 RX ADMIN — SODIUM CHLORIDE, POTASSIUM CHLORIDE, SODIUM LACTATE AND CALCIUM CHLORIDE: 600; 310; 30; 20 INJECTION, SOLUTION INTRAVENOUS at 08:28

## 2025-09-02 RX ADMIN — ACETAMINOPHEN 650 MG: 325 TABLET ORAL at 12:35

## 2025-09-02 RX ADMIN — CYCLOBENZAPRINE 10 MG: 10 TABLET, FILM COATED ORAL at 14:20

## 2025-09-02 RX ADMIN — MEPIVACAINE HYDROCHLORIDE 4 ML: 15 INJECTION, SOLUTION EPIDURAL; INFILTRATION at 08:39

## 2025-09-02 ASSESSMENT — PAIN SCALES - GENERAL
PAINLEVEL_OUTOF10: 0 - NO PAIN
PAINLEVEL_OUTOF10: 5 - MODERATE PAIN
PAINLEVEL_OUTOF10: 3
PAINLEVEL_OUTOF10: 7
PAINLEVEL_OUTOF10: 0 - NO PAIN
PAINLEVEL_OUTOF10: 7
PAINLEVEL_OUTOF10: 7
PAINLEVEL_OUTOF10: 5 - MODERATE PAIN
PAINLEVEL_OUTOF10: 4
PAINLEVEL_OUTOF10: 4
PAINLEVEL_OUTOF10: 0 - NO PAIN
PAINLEVEL_OUTOF10: 0 - NO PAIN
PAINLEVEL_OUTOF10: 3
PAINLEVEL_OUTOF10: 3

## 2025-09-02 ASSESSMENT — PAIN - FUNCTIONAL ASSESSMENT
PAIN_FUNCTIONAL_ASSESSMENT: 0-10
PAIN_FUNCTIONAL_ASSESSMENT: UNABLE TO SELF-REPORT
PAIN_FUNCTIONAL_ASSESSMENT: 0-10
PAIN_FUNCTIONAL_ASSESSMENT: UNABLE TO SELF-REPORT
PAIN_FUNCTIONAL_ASSESSMENT: 0-10

## 2025-09-02 ASSESSMENT — ACTIVITIES OF DAILY LIVING (ADL): ADL_ASSISTANCE: INDEPENDENT

## 2025-09-02 ASSESSMENT — PAIN DESCRIPTION - DESCRIPTORS
DESCRIPTORS: SHARP;ACHING
DESCRIPTORS: ACHING
DESCRIPTORS: DULL
DESCRIPTORS: ACHING
DESCRIPTORS: DULL;ACHING
DESCRIPTORS: DULL;ACHING

## 2025-09-02 ASSESSMENT — COGNITIVE AND FUNCTIONAL STATUS - GENERAL
CLIMB 3 TO 5 STEPS WITH RAILING: A LOT
MOBILITY SCORE: 17
WALKING IN HOSPITAL ROOM: A LITTLE
TURNING FROM BACK TO SIDE WHILE IN FLAT BAD: A LITTLE
STANDING UP FROM CHAIR USING ARMS: A LITTLE
MOVING TO AND FROM BED TO CHAIR: A LITTLE
MOVING FROM LYING ON BACK TO SITTING ON SIDE OF FLAT BED WITH BEDRAILS: A LITTLE

## 2025-09-02 ASSESSMENT — PAIN DESCRIPTION - LOCATION
LOCATION: KNEE
LOCATION: KNEE

## 2025-09-02 ASSESSMENT — PAIN DESCRIPTION - ORIENTATION
ORIENTATION: LEFT
ORIENTATION: LEFT

## 2025-09-03 ENCOUNTER — HOME CARE VISIT (OUTPATIENT)
Dept: HOME HEALTH SERVICES | Facility: HOME HEALTH | Age: 85
End: 2025-09-03
Payer: MEDICARE

## 2025-09-03 VITALS — SYSTOLIC BLOOD PRESSURE: 160 MMHG | HEART RATE: 107 BPM | DIASTOLIC BLOOD PRESSURE: 78 MMHG

## 2025-09-03 PROCEDURE — G0151 HHCP-SERV OF PT,EA 15 MIN: HCPCS

## 2025-09-03 ASSESSMENT — ENCOUNTER SYMPTOMS
PAIN LOCATION - PAIN FREQUENCY: CONSTANT
PERSON REPORTING PAIN: PATIENT
SLEEP QUALITY: ADEQUATE
PAIN LOCATION - PAIN QUALITY: THROBBING, ACHE
LOWEST PAIN SEVERITY IN PAST 24 HOURS: 6/10
HIGHEST PAIN SEVERITY IN PAST 24 HOURS: 9/10
PAIN: 1
ANGER WITHIN DEFINED LIMITS: 1
PAIN LOCATION: LEFT KNEE
AGGRESSION WITHIN DEFINED LIMITS: 1
PAIN LOCATION - PAIN SEVERITY: 6/10
PAIN LOCATION - EXACERBATING FACTORS: ROM

## 2025-09-03 ASSESSMENT — ACTIVITIES OF DAILY LIVING (ADL)
AMBULATION ASSISTANCE ON FLAT SURFACES: 1
ENTERING_EXITING_HOME: NEEDS ASSISTANCE
OASIS_M1830: 03

## 2025-09-05 ENCOUNTER — HOME CARE VISIT (OUTPATIENT)
Dept: HOME HEALTH SERVICES | Facility: HOME HEALTH | Age: 85
End: 2025-09-05
Payer: MEDICARE

## 2025-09-05 PROCEDURE — G0157 HHC PT ASSISTANT EA 15: HCPCS | Mod: CQ

## 2025-09-05 ASSESSMENT — ENCOUNTER SYMPTOMS
PAIN LOCATION - PAIN QUALITY: ACHING, SHARP
LOWEST PAIN SEVERITY IN PAST 24 HOURS: 5/10
PERSON REPORTING PAIN: PATIENT
HIGHEST PAIN SEVERITY IN PAST 24 HOURS: 9/10
PAIN LOCATION: LEFT KNEE
PAIN LOCATION - PAIN SEVERITY: 5/10
PAIN LOCATION - PAIN FREQUENCY: CONSTANT
PAIN: 1
SUBJECTIVE PAIN PROGRESSION: WAXING AND WANING

## 2025-10-15 ENCOUNTER — APPOINTMENT (OUTPATIENT)
Dept: DERMATOLOGY | Facility: CLINIC | Age: 85
End: 2025-10-15
Payer: MEDICARE

## 2026-01-12 ENCOUNTER — APPOINTMENT (OUTPATIENT)
Dept: PRIMARY CARE | Facility: CLINIC | Age: 86
End: 2026-01-12
Payer: MEDICARE

## 2026-02-03 ENCOUNTER — APPOINTMENT (OUTPATIENT)
Dept: ENDOCRINOLOGY | Facility: CLINIC | Age: 86
End: 2026-02-03
Payer: MEDICARE

## 2026-02-04 ENCOUNTER — APPOINTMENT (OUTPATIENT)
Dept: ENDOCRINOLOGY | Facility: CLINIC | Age: 86
End: 2026-02-04
Payer: MEDICARE

## (undated) DEVICE — CUFF, TOURNIQUET, 30 X 4, DUAL PORT/SNGL BLADDER, DISP, LF

## (undated) DEVICE — DRAPE, ISOLATION, ANTIMICROBIAL, W/POUCH, IOBAN 2, STERI DRAPE, 125 X 83 IN, DISPOSABLE, STERILE

## (undated) DEVICE — SUTURE, VICRYL PLUS, 2-0, 27IN, PSL, UND, BRAIDED

## (undated) DEVICE — BLADE, SAW, RECIPROCATING, DOUBLE-SIDED, 70 X 12.5 X 1 MM, STAINLESS STEEL

## (undated) DEVICE — CLOSURE SYSTEM, DERMABOND, PRINEO, 22CM, STERILE

## (undated) DEVICE — HOOD, SURGICAL, FLYTE SURGICOOL

## (undated) DEVICE — SUTURE, VICRYL, 1, 27 IN, CT-1, VIOLET

## (undated) DEVICE — MIXER, CEMENT, MIXEVAC III HIGH VACUUM KIT, STERILE

## (undated) DEVICE — Device

## (undated) DEVICE — GLOVE, SURGICAL, PROTEXIS PI ORTHO, 8.0, PF, LF

## (undated) DEVICE — GLOVE, SURGICAL, PROTEXIS PI W/NEU-THERA, 8.0, PF, LF

## (undated) DEVICE — SUTURE, MONOCRYL, 4-0, 18 IN, PS2, UNDYED

## (undated) DEVICE — BLADE, SAW, DUAL SAGITTAL, 1.9 X 90 X 30